# Patient Record
Sex: FEMALE | Race: WHITE | Employment: FULL TIME | URBAN - METROPOLITAN AREA
[De-identification: names, ages, dates, MRNs, and addresses within clinical notes are randomized per-mention and may not be internally consistent; named-entity substitution may affect disease eponyms.]

---

## 2017-04-11 ENCOUNTER — HOSPITAL ENCOUNTER (OUTPATIENT)
Dept: MAMMOGRAPHY | Age: 47
Discharge: HOME | End: 2017-04-11
Attending: OBSTETRICS & GYNECOLOGY | Admitting: OBSTETRICS & GYNECOLOGY

## 2017-04-11 DIAGNOSIS — Z12.31 VISIT FOR SCREENING MAMMOGRAM: ICD-10-CM

## 2018-06-02 ENCOUNTER — HOSPITAL ENCOUNTER (OUTPATIENT)
Dept: MAMMOGRAPHY | Age: 48
Discharge: HOME | End: 2018-06-02
Attending: SURGERY | Admitting: SURGERY

## 2018-06-02 DIAGNOSIS — Z12.31 VISIT FOR SCREENING MAMMOGRAM: ICD-10-CM

## 2022-04-26 ENCOUNTER — HOSPITAL ENCOUNTER (OUTPATIENT)
Dept: PHYSICAL THERAPY | Age: 52
Setting detail: THERAPIES SERIES
Discharge: HOME | End: 2022-04-26
Payer: COMMERCIAL

## 2022-04-26 PROCEDURE — 97530 THERAPEUTIC ACTIVITIES: CPT

## 2022-04-26 PROCEDURE — 97161 PT EVAL LOW COMPLEX 20 MIN: CPT

## 2022-04-26 PROCEDURE — 97110 THERAPEUTIC EXERCISES: CPT

## 2022-04-26 NOTE — PLAN OF CARE
[address] [address], [facility] [address]  Phone: [phone]   Fax: [phone]                                                       Rick King    Dear Dr. Yuli Francis  ,    We had the pleasure of evaluating the following patient for physical therapy services at [address] [place] [address]. A summary of our findings can be found in the initial assessment below. This includes our plan of care. If you have any questions or concerns regarding these findings, please do not hesitate to contact me at the office phone number checked above. Thank you for the referral.       Physician Signature:_______________________________Date:__________________  By signing above (or electronic signature), therapists plan is approved by physician      Patient: Ofelia Felix   : 1970   MRN: [id number]  Referring Physician:  Alfonzo Meckel, MD      Evaluation Date: 2022      Medical Diagnosis Information:  Diagnosis: M70.72 (ICD-10-CM) - Bursitis of left hip   Treatment Diagnosis: Decreased strength in the hip (B), decreased neuromuscular control                                         Insurance information: PT Insurance Information: UMR     Precautions/ Contra-indications: fibromyalgia, recent COVID 19  Latex Allergy:  [x]NO      []YES  Preferred Language for Healthcare:   [x]English       []Other:    C-SSRS Triggered by Intake questionnaire (Past 2 wk assessment ):   [x] No, Questionnaire did not trigger screening.   [] Yes, Patient intake triggered C-SSRS Screening     [] Completed, no further action required. [] Completed, PCP notified via Epic    SUBJECTIVE: Patient stated complaint:  Pt's L hip is bothering her the most lately. \"It's not terrible lately but I notice I'm walking differently\". Has had loose joints for a long time per pt.   Pt went on a trip to [place] where she walked a lot and since then the pain has been more intense, cannot sleep on the L side. Has had issues on the R side in the past as well. Pt used to have tenderness to palpation to the area however now is able to have some pressure but not entirely as laying on that side is still painful. Notices pain with stairs, walking, climbing ladders. Pt recently went on a trip and noticed that walking on sand actually helped the pain which surprised her. Had positive COVID test 2 weeks ago, noticed some SOB and dizziness when she was active during and shortly after recovery. SOB is improving. Relevant Medical History: hypertension (contolled), fibromyalgia  Functional Outcome: FOTO physical FS primary measure score = 54; Risk adjusted = 54    Pain Scale: 2/10 recently, In February pain was an 8/10. Easing factors: None  Provocative factors: activity    Type: [x]Constant   [x]Intermittent  []Radiating []Localized [x]Other: depends on activity level     Numbness/Tingling: none in the L hip    Occupation/School:  (desk job)    Living Status/Prior Level of Function:Prior to this injury / incident, pt was independent with ADLs and IADLs. Pt reports a long history of bursitis in both hips (a few years). Pain exacerbated when she broke her ankle and had to stay off of it. OBJECTIVE:   Palpation: TTP at the L great trochanter and anterior hip    Functional Mobility/Transfers: WNL    Posture:  WNL    Bandages/Dressings/Incisions: NA    Gait: (include devices/WB status) independent, WNL         PROM AROM    L R L R   Hip Flexion WFL with tightness and mild pain at end range WNL     Hip Abduction       Hip ER WFL withtightness at end range, felt better with stretching WNL     Hip IR WNL WNL     Knee Flexion       Knee Extension       Dorsiflexion        Plantarflexion        Inversion        Eversion            Strength (0-5) / Myotomes Left Right   Hip Flexion - supine 4-* 4   Hip Flexion - seated (L1-2) 4+ 4+   Hip Abduction 4-* 4+   Hip  4+ 4+   Hip Adduction     Hip ER     Hip IR     Quads (L2-4) 4+ 4+   Hamstrings 4+ 4+   Ankle Dorsiflexion (L4-5)     Ankle Plantarflexion (S1-2)     Ankle Inversion     Ankle Eversion (S1-2)     Great Toe Extension (L5)          Flexibility     Hamstrings (90/90) Lacking 20 deg Lacking 15 deg   ITB (Yadi)     Quads (Ely's) Mod decrease Mod decrease   Hip Flexor Deepti Jovel)          Girth     Mid patella     Suprapatellar     Figure 8     Transmalleolar     Metatarsal Heads         Joint mobility:    [x]Normal    []Hypo   []Hyper      Balance: NV                       [x] Patient history, allergies, meds reviewed. Medical chart reviewed. See intake form. Review Of Systems (ROS):  [x]Performed Review of systems (Integumentary, CardioPulmonary, Neurological) by intake and observation. Intake form has been scanned into medical record. Patient has been instructed to contact their primary care physician regarding ROS issues if not already being addressed at this time.       Co-morbidities/Complexities (which will affect course of rehabilitation):   []None        [x]Hx of COVID   Arthritic conditions   []Rheumatoid arthritis (M05.9)  []Osteoarthritis (M19.91)  []Gout   Cardiovascular conditions   [x]Hypertension (I10)  []Hyperlipidemia (E78.5)  []Angina pectoris (I20)  []Atherosclerosis (I70)  []Pacemaker  []Hx of CABG/stent/  cardiac surgeries   Musculoskeletal conditions   []Disc pathology   []Congenital spine pathologies   []Osteoporosis (M81.8)  []Osteopenia (M85.8)  []Scoliosis       Endocrine conditions   []Hypothyroid (E03.9)  []Hyperthyroid Gastrointestinal conditions   []Constipation (B59.04)   Metabolic conditions   []Morbid obesity (E66.01)  []Diabetes type 1(E10.65) or 2 (E11.65)   []Neuropathy (G60.9)     Cardio/Pulmonary conditions   []Asthma (J45)  []Coughing   []COPD (J44.9)  []CHF  []A-fib   Psychological Disorders  []Anxiety (F41.9)  []Depression (F32.9) []Other:   Developmental Disorders  []Autism (F84.0)  []CP (G80)  []Down Syndrome (Q90.9)  []Developmental delay     Neurological conditions  []Prior Stroke (I69.30)  []Parkinson's (G20)  []Encephalopathy (G93.40)  []MS (G35)  []Post-polio (G14)  []SCI  []TBI  []ALS Other conditions  [x]Fibromyalgia (M79.7)  []Vertigo  []Syncope  []Kidney Failure  []Cancer      []currently undergoing                treatment  []Pregnancy  []Incontinence   Prior surgeries  []involved limb  []previous spinal surgery  [] section birth  []hysterectomy  []bowel / bladder surgery  []other relevant surgeries   []Other:              Barriers to/and or personal factors that will affect rehab potential:              []Age  []Sex    []Smoker              []Motivation/Lack of Motivation                        [x]Co-Morbidities              []Cognitive Function, education/learning barriers              []Environmental, home barriers              []profession/work barriers  []past PT/medical experience  []other:  Justification:     Falls Risk Assessment (30 days):   [x] Falls Risk assessed and no intervention required. [] Falls Risk assessed and Patient requires intervention due to being higher risk   TUG score (>12s at risk):     [] Falls education provided, including        ASSESSMENT: Pt is a 45 yr old female presenting with L hip bursitis. Pt's symptoms have improved since onset however continued to have mild symptoms and has a history of recurrent bursitis on both hips. Pt's strength is decreased on the L and flexibility is mildly decreased in the hip flexors and hamstrings. Pt would benefit from skilled physical therapy to improved these impairments and increase neuromuscular control for functional activities like hiking, prolonged walking, and stairs.     Functional Impairments:     []Noted lumbar/proximal hip/LE joint hypomobility   []Decreased LE functional ROM   [x]Decreased core/proximal hip strength and neuromuscular control   [x]Decreased LE functional strength   [x]Reduced balance/proprioceptive control   []other:      Functional Activity Limitations (from functional questionnaire and intake)   []Reduced ability to tolerate prolonged functional positions   [x]Reduced ability or difficulty with changes of positions or transfers between positions   []Reduced ability to maintain good posture and demonstrate good body mechanics with sitting, bending, and lifting   []Reduced ability to sleep   [] Reduced ability or tolerance with driving and/or computer work   []Reduced ability to perform lifting, carrying tasks   []Reduced ability to squat   []Reduced ability to forward bend   [x]Reduced ability to ambulate prolonged functional periods/distances/surfaces   [x]Reduced ability to ascend/descend stairs   [x]Reduced ability to run, hop, cut or jump   []other:    Participation Restrictions   []Reduced participation in self care activities   []Reduced participation in home management activities   []Reduced participation in work activities   [x]Reduced participation in social activities. [x]Reduced participation in sport/recreation activities. Classification :    []Signs/symptoms consistent with post-surgical status including decreased ROM, strength and function.    []Signs/symptoms consistent with joint sprain/strain   []Signs/symptoms consistent with patella-femoral syndrome   []Signs/symptoms consistent with knee OA/hip OA   []Signs/symptoms consistent with internal derangement of knee/Hip   []Signs/symptoms consistent with functional hip weakness/NMR control      []Signs/symptoms consistent with tendinitis/tendinosis    []signs/symptoms consistent with pathology which may benefit from Dry needling      [x]other:  Signs/symptoms consistent with bursitis    Prognosis/Rehab Potential:      []Excellent   [x]Good    []Fair   []Poor    Tolerance of evaluation/treatment:    []Excellent   [x]Good    []Fair   []Poor    Physical Therapy Evaluation Complexity Justification  [x] A history of present problem with:  [] no personal factors and/or comorbidities that impact the plan of care;  [x]1-2 personal factors and/or comorbidities that impact the plan of care  []3 personal factors and/or comorbidities that impact the plan of care  [x] An examination of body systems using standardized tests and measures addressing any of the following: body structures and functions (impairments), activity limitations, and/or participation restrictions;:  [x] a total of 1-2 or more elements   [] a total of 3 or more elements   [] a total of 4 or more elements   [x] A clinical presentation with:  [] stable and/or uncomplicated characteristics   [x] evolving clinical presentation with changing characteristics  [] unstable and unpredictable characteristics;   [x] Clinical decision making of [x] low, [] moderate, [] high complexity using standardized patient assessment instrument and/or measurable assessment of functional outcome. [x] EVAL (LOW) 06884 (typically 15 minutes face-to-face)  [] EVAL (MOD) 27054 (typically 30 minutes face-to-face)  [] EVAL (HIGH) 94814 (typically 45 minutes face-to-face)  [] RE-EVAL     PLAN:   Frequency/Duration:  2 days per week for 6 Weeks:  Interventions:  [x]  Therapeutic exercise including: strength training, ROM, for Lower extremity and core   [x]  NMR activation and proprioception for LE, Glutes and Core   [x]  Manual therapy as indicated for LE, Hip and spine to include: Dry Needling/IASTM, STM, PROM, Gr I-IV mobilizations, manipulation. [x] Modalities as needed that may include: thermal agents, E-stim, Biofeedback, US, iontophoresis as indicated  [x] Patient education on joint protection, postural re-education, activity modification, progression of HEP. HEP instruction: Written HEP instructions provided and reviewed     Access Code: [email or web address]  URL: [email or web address]  Date: 04/26/2022  Prepared by: Jose Liu Dean    Exercises  Prone Quadriceps Stretch with Strap - 1 x daily - 7 x weekly - 1 sets - 3 reps  Supine Hamstring Stretch with Strap - 1 x daily - 7 x weekly - 1 sets - 3 reps  Supine ITB Stretch with Strap - 1 x daily - 7 x weekly - 1 sets - 3 reps  Sidelying ITB Stretch off Table - 1 x daily - 7 x weekly - 1 sets - 3 reps  ITB Stretch at Wall - 1 x daily - 7 x weekly - 1 sets - 3 reps  Supine ITB Stretch - 1 x daily - 7 x weekly - 1 sets - 3 reps  Clamshell - 1 x daily - 7 x weekly - 3 sets - 10 reps      GOALS:  Patient stated goal: To walk normally and loosed tight hip muscles. [] Progressing: [] Met: [] Not Met: [] Adjusted    Therapist goals for Patient:   Short Term Goals: To be achieved in: 2 weeks  1. Independent in HEP and progression per patient tolerance, in order to prevent re-injury. [] Progressing: [] Met: [] Not Met: [] Adjusted  2. Patient will have a decrease in pain to facilitate improvement in movement, function, and ADLs as indicated by Functional Deficits. [] Progressing: [] Met: [] Not Met: [] Adjusted    Long Term Goals: To be achieved in: 6 weeks  1. FOTO score of at least 65 to assist with reaching prior level of function. [] Progressing: [] Met: [] Not Met: [] Adjusted  2. Patient will demonstrate increased PROM to full and pain-free with HS and HF flexibility WNL to allow for proper joint functioning as indicated by patients Functional Deficits. - Progressing: - Met: - Not Met: - Adjusted  3. Patient will demonstrate an increase in Strength to at least 5/5 as well as good proximal hip strength and control to allow for proper functional mobility as indicated by patients Functional Deficits. [] Progressing: [] Met: [] Not Met: [] Adjusted  4. Patient will return to functional activities including ascending and descending 12 6\" steps independnetly without increased symptoms or restriction. [] Progressing: [] Met: [] Not Met: [] Adjusted  5.  Pt will tolerate 1 hour of ambulation or standing activity without increased symptoms. [] Progressing: [] Met: [] Not Met: [] Adjusted  6. Pt will report pain at rest is 0/10 and highest pain 1/10 for improved activity tolerance.    [] Progressing: [] Met: [] Not Met: [] Adjusted      Electronically signed by:  Linda Sharpe, PT, DPT

## 2022-04-27 NOTE — FLOWSHEET NOTE
[address] [facility]  Phone: [phone]   Fax: [phone]    Physical Therapy Treatment Note/ Progress Report:     Date:  2022    Patient Name:  Amalia Primrose    :  1970  MRN: [id number]  Restrictions/Precautions:    Medical/Treatment Diagnosis Information:  · Diagnosis: M70.72 (ICD-10-CM) - Bursitis of left hip  · Treatment Diagnosis: Decreased strength in the hip (B), decreased neuromuscular control  Insurance/Certification information:  PT Insurance Information: UMR  Physician Information:   Rafa Bentley MD  Plan of care signed (Y/N): []  Yes [x]  No     Date of Patient follow up with Physician:      Progress Report: []  Yes  [x]  No     Date Range for reporting period:  Beginnin22  PN:  Ending:     Progress report due (10 Rx/or 30 days whichever is less): visit #10 or      Recertification due (POC duration/ or 90 days whichever is less): visit #12 or 22    Visit # Insurance Allowable Auth required?  Date Range    60 combined, 10% co-insurance []  Yes  [x]  No NA       Latex Allergy:  [x]NO      []YES  Preferred Language for Healthcare:   [x]English       []other:    Functional Scale:           Date assessed:  FOTO physical FS primary measure score = 54; risk adjusted = 54  22    Pain level:  210     SUBJECTIVE:  See eval    OBJECTIVE: See eval      RESTRICTIONS/PRECAUTIONS: fibromyalgia    Exercises/Interventions:     Therapeutic Exercise (92314)  Resistance / level Sets/sec Reps Notes / Cues          Bike/dynamic warm up              Lateral band walks       Monster walks              Excursion lunges       Wall sits       Hip 3 way with band              Therapeutic Activities (09830)       Step ups 6\"      Heel taps 4\"                    Neuromuscular Re-ed (20293)       Assess balance                     Manual Intervention (42280)       Consider end range hip PROM in the future Modalities:     Pt. Education:  -pt educated on diagnosis, prognosis and expectations for rehab  -all pt questions were answered    Home Exercise Program:  4/27: Access Code: [email or web address]  URL: [email or web address]  Date: 04/26/2022  Prepared by: Giancarlo Gregg     Exercises  Prone Quadriceps Stretch with Strap - 1 x daily - 7 x weekly - 1 sets - 3 reps  Supine Hamstring Stretch with Strap - 1 x daily - 7 x weekly - 1 sets - 3 reps  Supine ITB Stretch with Strap - 1 x daily - 7 x weekly - 1 sets - 3 reps  Sidelying ITB Stretch off Table - 1 x daily - 7 x weekly - 1 sets - 3 reps  ITB Stretch at Wall - 1 x daily - 7 x weekly - 1 sets - 3 reps  Supine ITB Stretch - 1 x daily - 7 x weekly - 1 sets - 3 reps  Clamshell - 1 x daily - 7 x weekly - 3 sets - 10 reps       Therapeutic Exercise and NMR EXR  [x] (91588) Provided verbal/tactile cueing for activities related to strengthening, flexibility, endurance, ROM for improvements in LE, proximal hip, and core control with self care, mobility, lifting, ambulation.  [] (42341) Provided verbal/tactile cueing for activities related to improving balance, coordination, kinesthetic sense, posture, motor skill, proprioception  to assist with LE, proximal hip, and core control in self care, mobility, lifting, ambulation and eccentric single leg control.   [] (02752) Therapist is in constant attendance of 2 or more patients providing skilled therapy interventions, but not providing any significant amount of measurable one-on-one time to either patient, for improvements in LE, proximal hip, and core control in self care, mobility, lifting, ambulation and eccentric single leg control.      NMR and Therapeutic Activities:    [] (73740 or 60083) Provided verbal/tactile cueing for activities related to improving balance, coordination, kinesthetic sense, posture, motor skill, proprioception and motor activation to allow for proper function of core, proximal hip and LE with self care and ADLs  [] (98578) Gait Re-education- Provided training and instruction to the patient for proper LE, core and proximal hip recruitment and positioning and eccentric body weight control with ambulation re-education including up and down stairs     Home Exercise Program:    [x] (80114) Reviewed/Progressed HEP activities related to strengthening, flexibility, endurance, ROM of core, proximal hip and LE for functional self-care, mobility, lifting and ambulation/stair navigation   [] (81282)Reviewed/Progressed HEP activities related to improving balance, coordination, kinesthetic sense, posture, motor skill, proprioception of core, proximal hip and LE for self care, mobility, lifting, and ambulation/stair navigation      Manual Treatments:  PROM / STM / Oscillations-Mobs:  G-I, II, III, IV (PA's, Inf., Post.)  [] (67449) Provided manual therapy to mobilize LE, proximal hip and/or LS spine soft tissue/joints for the purpose of modulating pain, promoting relaxation,  increasing ROM, reducing/eliminating soft tissue swelling/inflammation/restriction, improving soft tissue extensibility and allowing for proper ROM for normal function with self care, mobility, lifting and ambulation. Modalities:  [] (21888) Vasopneumatic compression: Utilized vasopneumatic compression to decrease edema / swelling for the purpose of improving mobility and quad tone / recruitment which will allow for increased overall function including but not limited to self-care, transfers, ambulation, and ascending / descending stairs.        Charges:  Timed Code Treatment Minutes: 25   Total Treatment Minutes: 45     [x] EVAL - LOW (35823)   [] EVAL - MOD (39000)  [] EVAL - HIGH (58944)  [] RE-EVAL (81661)  [x] FL(36963) x  1    [] Ionto  [] NMR (43475) x      [] Vaso  [] Manual (22832) x      [] Ultrasound  [x] TA x   1    [] Mech Traction (27425)  [] Aquatic Therapy x     [] ES (un) (99748):   [] Home Management Training x [] ES(attended) (12601)   [] Group:     [] Other:     GOALS:   Patient stated goal: To walk normally and loosed tight hip muscles. []? Progressing: []? Met: []? Not Met: []? Adjusted     Therapist goals for Patient:   Short Term Goals: To be achieved in: 2 weeks  1. Independent in HEP and progression per patient tolerance, in order to prevent re-injury. []? Progressing: []? Met: []? Not Met: []? Adjusted  2. Patient will have a decrease in pain to facilitate improvement in movement, function, and ADLs as indicated by Functional Deficits. []? Progressing: []? Met: []? Not Met: []? Adjusted     Long Term Goals: To be achieved in: 6 weeks  1. FOTO score of at least 65 to assist with reaching prior level of function. []? Progressing: []? Met: []? Not Met: []? Adjusted  2. Patient will demonstrate increased PROM to full and pain-free with HS and HF flexibility WNL to allow for proper joint functioning as indicated by patients Functional Deficits. - Progressing: - Met: - Not Met: - Adjusted  3. Patient will demonstrate an increase in Strength to at least 5/5 as well as good proximal hip strength and control to allow for proper functional mobility as indicated by patients Functional Deficits. []? Progressing: []? Met: []? Not Met: []? Adjusted  4. Patient will return to functional activities including ascending and descending 12 6\" steps independnetly without increased symptoms or restriction. []? Progressing: []? Met: []? Not Met: []? Adjusted  5. Pt will tolerate 1 hour of ambulation or standing activity without increased symptoms. []? Progressing: []? Met: []? Not Met: []? Adjusted  6. Pt will report pain at rest is 0/10 and highest pain 1/10 for improved activity tolerance. []? Progressing: []? Met: []? Not Met: []? Adjusted          Overall Progression Towards Functional goals/ Treatment Progress Update:  [] Patient is progressing as expected towards functional goals listed.     [] Progression is slowed due to complexities/Impairments listed. [] Progression has been slowed due to co-morbidities. [x] Plan just implemented, too soon to assess goals progression <30days   [] Goals require adjustment due to lack of progress  [] Patient is not progressing as expected and requires additional follow up with physician  [] Other    Persisting Functional Limitations/Impairments:  [x]Sitting [x]Standing   []Walking [x]Stairs   [x]Transfers []ADLs   [x]Squatting/bending []Kneeling  []Housework []Job related tasks  []Driving [x]Sports/Recreation   []Sleeping []Other:    ASSESSMENT:  See eval  Treatment/Activity Tolerance:  [] Pt able to complete treatment [] Patient limited by fatique  [] Patient limited by pain  [] Patient limited by other medical complications  [] Other:     Prognosis: [x] Good [] Fair  [] Poor    Patient Requires Follow-up: [x] Yes  [] No    Return to Play:    [x]  N/A   []  Stage 1: Intro to Strength   []  Stage 2: Return to Run and Strength   []  Stage 3: Return to Jump and Strength   []  Stage 4: Dynamic Strength and Agility   []  Stage 5: Sport Specific Training     []  Ready to Return to Play, Meets All Above Stages   []  Not Ready for Return to Sports   Comments:            PLAN: See eval. PT 2x / week for 6 weeks. [] Continue per plan of care [] Alter current plan (see comments)  [x] Plan of care initiated [] Hold pending MD visit [] Discharge    Electronically signed by: Constantino Wade, PT, DPT      Note: If patient does not return for scheduled/ recommended follow up visits, this note will serve as a discharge from care along with most recent update on progress.

## 2022-04-29 ENCOUNTER — HOSPITAL ENCOUNTER (OUTPATIENT)
Dept: PHYSICAL THERAPY | Age: 52
Setting detail: THERAPIES SERIES
Discharge: HOME | End: 2022-04-29
Payer: COMMERCIAL

## 2022-04-29 PROCEDURE — 97110 THERAPEUTIC EXERCISES: CPT

## 2022-04-29 PROCEDURE — 97140 MANUAL THERAPY 1/> REGIONS: CPT

## 2022-04-29 NOTE — FLOWSHEET NOTE
[address] [facility]  Phone: [phone]   Fax: [phone]    Physical Therapy Treatment Note/ Progress Report:     Date:  2022    Patient Name:  Jamaal Mcintosh    :  1970  MRN: [id number]  Restrictions/Precautions:    Medical/Treatment Diagnosis Information:  · Diagnosis: M70.72 (ICD-10-CM) - Bursitis of left hip  · Treatment Diagnosis: Decreased strength in the hip (B), decreased neuromuscular control  Insurance/Certification information:  PT Insurance Information: R  Physician Information:   Bernard Duran MD  Plan of care signed (Y/N): []  Yes [x]  No     Date of Patient follow up with Physician:      Progress Report: []  Yes  [x]  No     Date Range for reporting period:   Beginnin22  PN:  Ending:     Progress report due (10 Rx/or 30 days whichever is less): visit #10 or      Recertification due (POC duration/ or 90 days whichever is less): visit #12 or 22    Visit # Insurance Allowable Auth required? Date Range    60 combined, 10% co-insurance []  Yes  [x]  No NA       Latex Allergy:  [x]NO      []YES  Preferred Language for Healthcare:   [x]English       []other:    Functional Scale:           Date assessed:  [facility] physical FS primary measure score = 54; risk adjusted = 54  22    Pain level:  2/10     SUBJECTIVE:  Pt reports that she is doing well, clamshells are difficult but doable, has bee compliant with her home stretches. OBJECTIVE: See eval  : SLB 2-5 sec with excessive hip and knee strategies.        RESTRICTIONS/PRECAUTIONS: fibromyalgia    Exercises/Interventions:     Therapeutic Exercise (22864)  Resistance / level Sets/sec Reps Notes / Cues          Bike/dynamic warm up:  High knees  hacky sack kicks  Butt kicks  3 way HR     20  20  20  10 ea    gastroc stretch  X 30 sec B     Lateral band walks orange  3 laps    Monster walks orange  3 laps           Excursion lunges       Wall sits       Hip 3 way with fingertip assist   10 B           ITB stretch with strap  2 x 30\" B                   Therapeutic Activities (53637)       Step ups 6\"      Heel taps (lat) 4\"  10 B                  Neuromuscular Re-ed (12370)       Soccer ball rolls A/P, M/L, CW/CCW  10 ea B                  Manual Intervention ([id number])       Consider end range hip PROM in the future              Roller (green) to the L glute and ITB  x10'                              Modalities:     Pt. Education:  -pt educated on diagnosis, prognosis and expectations for rehab  -all pt questions were answered    Home Exercise Program:  4/27: Access Code: [email or web address]  URL: [email or web address]  Date: 04/26/2022  Prepared by: Jovi Agudelo     Exercises  Prone Quadriceps Stretch with Strap - 1 x daily - 7 x weekly - 1 sets - 3 reps  Supine Hamstring Stretch with Strap - 1 x daily - 7 x weekly - 1 sets - 3 reps  Supine ITB Stretch with Strap - 1 x daily - 7 x weekly - 1 sets - 3 reps  Sidelying ITB Stretch off Table - 1 x daily - 7 x weekly - 1 sets - 3 reps  ITB Stretch at Wall - 1 x daily - 7 x weekly - 1 sets - 3 reps  Supine ITB Stretch - 1 x daily - 7 x weekly - 1 sets - 3 reps  Clamshell - 1 x daily - 7 x weekly - 3 sets - 10 reps       Therapeutic Exercise and NMR EXR  [x] (16931) Provided verbal/tactile cueing for activities related to strengthening, flexibility, endurance, ROM for improvements in LE, proximal hip, and core control with self care, mobility, lifting, ambulation.  [] (24944) Provided verbal/tactile cueing for activities related to improving balance, coordination, kinesthetic sense, posture, motor skill, proprioception  to assist with LE, proximal hip, and core control in self care, mobility, lifting, ambulation and eccentric single leg control.   [] (01266) Therapist is in constant attendance of 2 or more patients providing skilled therapy interventions, but not providing any significant amount of measurable one-on-one time to either patient, for improvements in LE, proximal hip, and core control in self care, mobility, lifting, ambulation and eccentric single leg control. NMR and Therapeutic Activities:    [] (00389 or 17312) Provided verbal/tactile cueing for activities related to improving balance, coordination, kinesthetic sense, posture, motor skill, proprioception and motor activation to allow for proper function of core, proximal hip and LE with self care and ADLs  [] (41110) Gait Re-education- Provided training and instruction to the patient for proper LE, core and proximal hip recruitment and positioning and eccentric body weight control with ambulation re-education including up and down stairs     Home Exercise Program:    [x] (52031) Reviewed/Progressed HEP activities related to strengthening, flexibility, endurance, ROM of core, proximal hip and LE for functional self-care, mobility, lifting and ambulation/stair navigation   [] (82992)Reviewed/Progressed HEP activities related to improving balance, coordination, kinesthetic sense, posture, motor skill, proprioception of core, proximal hip and LE for self care, mobility, lifting, and ambulation/stair navigation      Manual Treatments:  PROM / STM / Oscillations-Mobs:  G-I, II, III, IV (PA's, Inf., Post.)  [] (53513) Provided manual therapy to mobilize LE, proximal hip and/or LS spine soft tissue/joints for the purpose of modulating pain, promoting relaxation,  increasing ROM, reducing/eliminating soft tissue swelling/inflammation/restriction, improving soft tissue extensibility and allowing for proper ROM for normal function with self care, mobility, lifting and ambulation.      Modalities:  [] (86217) Vasopneumatic compression: Utilized vasopneumatic compression to decrease edema / swelling for the purpose of improving mobility and quad tone / recruitment which will allow for increased overall function including but not limited to self-care, transfers, ambulation, and ascending / descending stairs. Charges:  Timed Code Treatment Minutes: 40   Total Treatment Minutes: 40     [] EVAL - LOW (98093)   [] EVAL - MOD (16888)  [] EVAL - HIGH (26932)  [] RE-EVAL (92195)  [x] QT(57538) x  2   [] Ionto  [] NMR (75680) x      [] Vaso  [x] Manual (38930) x  1    [] Ultrasound  [] TA x   1    [] Mech Traction (33508)  [] Aquatic Therapy x     [] ES (un) (92288):   [] Home Management Training x [] ES(attended) (96224)   [] Group:     [] Other:     GOALS:   Patient stated goal: To walk normally and loosed tight hip muscles. []? Progressing: []? Met: []? Not Met: []? Adjusted     Therapist goals for Patient:   Short Term Goals: To be achieved in: 2 weeks  1. Independent in HEP and progression per patient tolerance, in order to prevent re-injury. []? Progressing: []? Met: []? Not Met: []? Adjusted  2. Patient will have a decrease in pain to facilitate improvement in movement, function, and ADLs as indicated by Functional Deficits. []? Progressing: []? Met: []? Not Met: []? Adjusted     Long Term Goals: To be achieved in: 6 weeks  1. FOTO score of at least 65 to assist with reaching prior level of function. []? Progressing: []? Met: []? Not Met: []? Adjusted  2. Patient will demonstrate increased PROM to full and pain-free with HS and HF flexibility WNL to allow for proper joint functioning as indicated by patients Functional Deficits. - Progressing: - Met: - Not Met: - Adjusted  3. Patient will demonstrate an increase in Strength to at least 5/5 as well as good proximal hip strength and control to allow for proper functional mobility as indicated by patients Functional Deficits. []? Progressing: []? Met: []? Not Met: []? Adjusted  4. Patient will return to functional activities including ascending and descending 12 6\" steps independnetly without increased symptoms or restriction. []? Progressing: []? Met: []? Not Met: []? Adjusted  5.  Pt will tolerate 1 hour of ambulation or standing activity without increased symptoms. []? Progressing: []? Met: []? Not Met: []? Adjusted  6. Pt will report pain at rest is 0/10 and highest pain 1/10 for improved activity tolerance. []? Progressing: []? Met: []? Not Met: []? Adjusted          Overall Progression Towards Functional goals/ Treatment Progress Update:  [] Patient is progressing as expected towards functional goals listed. [] Progression is slowed due to complexities/Impairments listed. [] Progression has been slowed due to co-morbidities. [x] Plan just implemented, too soon to assess goals progression <30days   [] Goals require adjustment due to lack of progress  [] Patient is not progressing as expected and requires additional follow up with physician  [] Other    Persisting Functional Limitations/Impairments:  [x]Sitting [x]Standing   []Walking [x]Stairs   [x]Transfers []ADLs   [x]Squatting/bending []Kneeling  []Housework []Job related tasks  []Driving [x]Sports/Recreation   []Sleeping []Other:    ASSESSMENT:  Pt fatigued with initiation of exercises. Added rolling to end of session to improve tolerance. Will monitor NV and progress log and HEP as tolerable. Treatment/Activity Tolerance:  [] Pt able to complete treatment [x] Patient limited by fatique  [] Patient limited by pain  [] Patient limited by other medical complications  [] Other:     Prognosis: [x] Good [] Fair  [] Poor    Patient Requires Follow-up: [x] Yes  [] No    Return to Play:    [x]  N/A   []  Stage 1: Intro to Strength   []  Stage 2: Return to Run and Strength   []  Stage 3: Return to Jump and Strength   []  Stage 4: Dynamic Strength and Agility   []  Stage 5: Sport Specific Training     []  Ready to Return to Play, Meets All Above Stages   []  Not Ready for Return to Sports   Comments:            PLAN: See eval. PT 2x / week for 6 weeks.    [] Continue per plan of care [] Alter current plan (see comments)  [x] Plan of care initiated [] Hold pending MD visit [] Discharge    Electronically signed by: Miguelito Crow, PT, DPT      Note: If patient does not return for scheduled/ recommended follow up visits, this note will serve as a discharge from care along with most recent update on progress.

## 2022-05-04 ENCOUNTER — HOSPITAL ENCOUNTER (OUTPATIENT)
Dept: PHYSICAL THERAPY | Age: 52
Setting detail: THERAPIES SERIES
Discharge: HOME | End: 2022-05-04
Payer: COMMERCIAL

## 2022-05-04 PROCEDURE — 97140 MANUAL THERAPY 1/> REGIONS: CPT

## 2022-05-04 PROCEDURE — 97110 THERAPEUTIC EXERCISES: CPT

## 2022-05-04 PROCEDURE — 97112 NEUROMUSCULAR REEDUCATION: CPT

## 2022-05-04 NOTE — FLOWSHEET NOTE
[address] [facility]  Phone: [phone]   Fax: [phone]    Physical Therapy Treatment Note/ Progress Report:     Date:  2022    Patient Name:  Hortencia Robles    :  1970  MRN: [id number]  Restrictions/Precautions:    Medical/Treatment Diagnosis Information:  · Diagnosis: M70.72 (ICD-10-CM) - Bursitis of left hip  · Treatment Diagnosis: Decreased strength in the hip (B), decreased neuromuscular control  Insurance/Certification information:  PT Insurance Information: R  Physician Information:   Christopher Noriega MD  Plan of care signed (Y/N): []  Yes [x]  No     Date of Patient follow up with Physician:      Progress Report: []  Yes  [x]  No     Date Range for reporting period:   Beginnin22  PN:  Ending:     Progress report due (10 Rx/or 30 days whichever is less): visit #10 or      Recertification due (POC duration/ or 90 days whichever is less): visit #12 or 22    Visit # Insurance Allowable Auth required? Date Range   3/12 60 combined, 10% co-insurance []  Yes  [x]  No NA       Latex Allergy:  [x]NO      []YES  Preferred Language for Healthcare:   [x]English       []other:    Functional Scale:           Date assessed:  [facility] physical FS primary measure score = 54; risk adjusted = 54  22    Pain level:  2/10     SUBJECTIVE:  Pt reports that she has been feeling pretty good. Notices a small amount of pain with vacuuming but not too much pain throughout the day. Mild spasms after last session the evening after last session. Has since resolved. OBJECTIVE: See eval  : SLB 2-5 sec with excessive hip and knee strategies.         RESTRICTIONS/PRECAUTIONS: fibromyalgia    Exercises/Interventions:     Therapeutic Exercise (24705)  Resistance / level Sets/sec Reps Notes / Cues          Bike/dynamic warm up:  High knees  hacky sack kicks  Butt kicks  3 way HR    Bike     40  20  20  10 ea    4'    gastroc stretch  X 30 sec B Lateral band walks    Monster walks    Lateral squat walks  20 ft 4    Excursion lunges   5 B    Wall sits  To fatigue 4 reps    Hip 3 way with fingertip assist  2 10 B           ITB stretch with strap                  Therapeutic Activities (42234)       Step ups 6\"      Heel taps (lat) 4\"  10 B 5/4: ant. Knee pain noted. Neuromuscular Re-ed (80180)       Soccer ball rolls A/P, M/L, CW/CCW  10 ea B    SLB airex 3 x 30\"            Manual Intervention (11780)       Consider end range hip PROM in the future              Roller (green) to the L glute and ITB  x10'                              Modalities:     Pt. Education:  -pt educated on diagnosis, prognosis and expectations for rehab  -all pt questions were answered    Home Exercise Program:  5/4:  Access Code: [email or web address]  URL: [employer]/  Date: 05/04/2022  Prepared by: Dominique Hastings    Exercises  Standing Repeated Hip Flexion with Ankle Weight - 1 x daily - 3-5 x weekly - 2 sets - 10 reps  Standing Hip Abduction - 1 x daily - 3-5 x weekly - 2 sets - 10 reps  Standing Hip Extension - 1 x daily - 3-5 x weekly - 2 sets - 10 reps      4/27: Access Code: [email or web address]  URL: [employer]/  Date: 04/26/2022  Prepared by: Dominique Hastings     Exercises  Prone Quadriceps Stretch with Strap - 1 x daily - 7 x weekly - 1 sets - 3 reps  Supine Hamstring Stretch with Strap - 1 x daily - 7 x weekly - 1 sets - 3 reps  Supine ITB Stretch with Strap - 1 x daily - 7 x weekly - 1 sets - 3 reps  Sidelying ITB Stretch off Table - 1 x daily - 7 x weekly - 1 sets - 3 reps  ITB Stretch at Wall - 1 x daily - 7 x weekly - 1 sets - 3 reps  Supine ITB Stretch - 1 x daily - 7 x weekly - 1 sets - 3 reps  Clamshell - 1 x daily - 7 x weekly - 3 sets - 10 reps       Therapeutic Exercise and NMR EXR  [x] (26293) Provided verbal/tactile cueing for activities related to strengthening, flexibility, endurance, ROM for improvements in LE, proximal hip, and core control with self care, mobility, lifting, ambulation.  [] (65119) Provided verbal/tactile cueing for activities related to improving balance, coordination, kinesthetic sense, posture, motor skill, proprioception  to assist with LE, proximal hip, and core control in self care, mobility, lifting, ambulation and eccentric single leg control.   [] (77453) Therapist is in constant attendance of 2 or more patients providing skilled therapy interventions, but not providing any significant amount of measurable one-on-one time to either patient, for improvements in LE, proximal hip, and core control in self care, mobility, lifting, ambulation and eccentric single leg control.      NMR and Therapeutic Activities:    [] (71711 or 04674) Provided verbal/tactile cueing for activities related to improving balance, coordination, kinesthetic sense, posture, motor skill, proprioception and motor activation to allow for proper function of core, proximal hip and LE with self care and ADLs  [] (78301) Gait Re-education- Provided training and instruction to the patient for proper LE, core and proximal hip recruitment and positioning and eccentric body weight control with ambulation re-education including up and down stairs     Home Exercise Program:    [x] (46388) Reviewed/Progressed HEP activities related to strengthening, flexibility, endurance, ROM of core, proximal hip and LE for functional self-care, mobility, lifting and ambulation/stair navigation   [] (26334)Reviewed/Progressed HEP activities related to improving balance, coordination, kinesthetic sense, posture, motor skill, proprioception of core, proximal hip and LE for self care, mobility, lifting, and ambulation/stair navigation      Manual Treatments:  PROM / STM / Oscillations-Mobs:  G-I, II, III, IV (PA's, Inf., Post.)  [x] (67913) Provided manual therapy to mobilize LE, proximal hip and/or LS spine soft tissue/joints for the purpose of modulating pain, promoting relaxation,  increasing ROM, reducing/eliminating soft tissue swelling/inflammation/restriction, improving soft tissue extensibility and allowing for proper ROM for normal function with self care, mobility, lifting and ambulation. Modalities:  [] (92530) Vasopneumatic compression: Utilized vasopneumatic compression to decrease edema / swelling for the purpose of improving mobility and quad tone / recruitment which will allow for increased overall function including but not limited to self-care, transfers, ambulation, and ascending / descending stairs. Charges:  Timed Code Treatment Minutes: 43   Total Treatment Minutes: 43     [] EVAL - LOW (00325)   [] EVAL - MOD (31958)  [] EVAL - HIGH (99761)  [] RE-EVAL (59502)  [x] [place](31062) x  1   [] Ionto  [x] NMR (36538) x   1   [] Vaso  [x] Manual (73045) x  1    [] Ultrasound  [] TA x   1    [] Mech Traction (51943)  [] Aquatic Therapy x     [] ES (un) (12664):   [] Home Management Training x [] ES(attended) (87967)   [] Group:     [] Other:     GOALS:   Patient stated goal: To walk normally and loosed tight hip muscles. []? Progressing: []? Met: []? Not Met: []? Adjusted     Therapist goals for Patient:   Short Term Goals: To be achieved in: 2 weeks  1. Independent in HEP and progression per patient tolerance, in order to prevent re-injury. []? Progressing: []? Met: []? Not Met: []? Adjusted  2. Patient will have a decrease in pain to facilitate improvement in movement, function, and ADLs as indicated by Functional Deficits. []? Progressing: []? Met: []? Not Met: []? Adjusted     Long Term Goals: To be achieved in: 6 weeks  1. FOTO score of at least 65 to assist with reaching prior level of function. []? Progressing: []? Met: []? Not Met: []? Adjusted  2. Patient will demonstrate increased PROM to full and pain-free with HS and HF flexibility WNL to allow for proper joint functioning as indicated by patients Functional Deficits.    - Progressing: - Met: - Not Met: - Adjusted  3. Patient will demonstrate an increase in Strength to at least 5/5 as well as good proximal hip strength and control to allow for proper functional mobility as indicated by patients Functional Deficits. []? Progressing: []? Met: []? Not Met: []? Adjusted  4. Patient will return to functional activities including ascending and descending 12 6\" steps independnetly without increased symptoms or restriction. []? Progressing: []? Met: []? Not Met: []? Adjusted  5. Pt will tolerate 1 hour of ambulation or standing activity without increased symptoms. []? Progressing: []? Met: []? Not Met: []? Adjusted  6. Pt will report pain at rest is 0/10 and highest pain 1/10 for improved activity tolerance. []? Progressing: []? Met: []? Not Met: []? Adjusted          Overall Progression Towards Functional goals/ Treatment Progress Update:  [] Patient is progressing as expected towards functional goals listed. [] Progression is slowed due to complexities/Impairments listed. [] Progression has been slowed due to co-morbidities. [x] Plan just implemented, too soon to assess goals progression <30days   [] Goals require adjustment due to lack of progress  [] Patient is not progressing as expected and requires additional follow up with physician  [] Other    Persisting Functional Limitations/Impairments:  [x]Sitting [x]Standing   []Walking [x]Stairs   [x]Transfers []ADLs   [x]Squatting/bending []Kneeling  []Housework []Job related tasks  []Driving [x]Sports/Recreation   []Sleeping []Other:    ASSESSMENT:  Progressed HEP to include hip 3 way. Continued progression with 1 instance of mild anterior knee pain however did not persist.  Will continue to progress as tolerable.    Treatment/Activity Tolerance:  [] Pt able to complete treatment [x] Patient limited by fatique  [] Patient limited by pain  [] Patient limited by other medical complications  [] Other:     Prognosis: [x] Good [] Fair  [] Poor    Patient Requires Follow-up: [x] Yes  [] No    Return to Play:    [x]  N/A   []  Stage 1: Intro to Strength   []  Stage 2: Return to Run and Strength   []  Stage 3: Return to Jump and Strength   []  Stage 4: Dynamic Strength and Agility   []  Stage 5: Sport Specific Training     []  Ready to Return to Play, Meets All Above Stages   []  Not Ready for Return to Sports   Comments:            PLAN: See eval. PT 2x / week for 6 weeks. [] Continue per plan of care [] Alter current plan (see comments)  [x] Plan of care initiated [] Hold pending MD visit [] Discharge    Electronically signed by: Jovi Agudelo, PT, DPT      Note: If patient does not return for scheduled/ recommended follow up visits, this note will serve as a discharge from care along with most recent update on progress.

## 2022-05-06 ENCOUNTER — HOSPITAL ENCOUNTER (OUTPATIENT)
Dept: PHYSICAL THERAPY | Age: 52
Setting detail: THERAPIES SERIES
Discharge: HOME | End: 2022-05-06
Payer: COMMERCIAL

## 2022-05-06 PROCEDURE — 97112 NEUROMUSCULAR REEDUCATION: CPT

## 2022-05-06 PROCEDURE — 97110 THERAPEUTIC EXERCISES: CPT

## 2022-05-06 PROCEDURE — 97140 MANUAL THERAPY 1/> REGIONS: CPT

## 2022-05-06 NOTE — FLOWSHEET NOTE
[address] [facility]  Phone: [phone]   Fax: [phone]    Physical Therapy Treatment Note/ Progress Report:     Date:  2022    Patient Name:  Yudi Edouard    :  1970  MRN: [id number]  Restrictions/Precautions:    Medical/Treatment Diagnosis Information:  · Diagnosis: M70.72 (ICD-10-CM) - Bursitis of left hip  · Treatment Diagnosis: Decreased strength in the hip (B), decreased neuromuscular control  Insurance/Certification information:  PT Insurance Information: UMR  Physician Information:   Mally Mancilla MD  Plan of care signed (Y/N): []  Yes [x]  No     Date of Patient follow up with Physician:      Progress Report: []  Yes  [x]  No     Date Range for reporting period:   Beginnin22  PN:  Ending:     Progress report due (10 Rx/or 30 days whichever is less): visit #10 or      Recertification due (POC duration/ or 90 days whichever is less): visit #12 or 22    Visit # Insurance Allowable Auth required? Date Range    60 combined, 10% co-insurance []  Yes  [x]  No NA       Latex Allergy:  [x]NO      []YES  Preferred Language for Healthcare:   [x]English       []other:    Functional Scale:           Date assessed:  [facility] physical FS primary measure score = 54; risk adjusted = 54  22    Pain level:  2/10     SUBJECTIVE:  Pt reports 3/10 pain today, having pain in the low back and knee. Had some low back pain after last session but no increase in pain in the hip. Completing stairs are improving (not as slow and painful). OBJECTIVE: See eval  : SLB 2-5 sec with excessive hip and knee strategies.         RESTRICTIONS/PRECAUTIONS: fibromyalgia    Exercises/Interventions:     Therapeutic Exercise (99839)  Resistance / level Sets/sec Reps Notes / Cues          Bike/dynamic warm up:  High knees  hacky sack kicks  Butt kicks  3 way HR    Bike     20  20  20  10 ea    4'    gastroc stretch  X 30 sec B     Lateral band walks Monster walks    Lateral squat walks     Excursion lunges     Wall sits     Hip 3 way with fingertip assist            ITB stretch with strap           SL hip abduction  2 10 B    SL hip abd iso with hip flexion/ext   10 B    Bridging with band Lime,   3 sec hold 2 10           Therapeutic Activities (30358)       Step ups 6\"      Heel taps (lat) 5/4: ant. Knee pain noted. Neuromuscular Re-ed (79897)       Soccer ball rolls A/P, M/L, CW/CCW  15 ea B    SLB     Gliders (fwd,lat)   10 ea B    SLB with ABCs  1 set B                   Manual Intervention (98472)       Consider end range hip PROM in the future              Roller (green) to the L glute and ITB  x10'     STM L lumbar  x4'                       Modalities:     Pt. Education:  -pt educated on diagnosis, prognosis and expectations for rehab  -all pt questions were answered    Home Exercise Program:  5/4:  Access Code: [email or web address]  URL: [email or web address]  Date: 05/04/2022  Prepared by: Candy Lopez    Exercises  Standing Repeated Hip Flexion with Ankle Weight - 1 x daily - 3-5 x weekly - 2 sets - 10 reps  Standing Hip Abduction - 1 x daily - 3-5 x weekly - 2 sets - 10 reps  Standing Hip Extension - 1 x daily - 3-5 x weekly - 2 sets - 10 reps      4/27: Access Code: [email or web address]  URL: [email or web address]  Date: 04/26/2022  Prepared by: Candy Lopez     Exercises  Prone Quadriceps Stretch with Strap - 1 x daily - 7 x weekly - 1 sets - 3 reps  Supine Hamstring Stretch with Strap - 1 x daily - 7 x weekly - 1 sets - 3 reps  Supine ITB Stretch with Strap - 1 x daily - 7 x weekly - 1 sets - 3 reps  Sidelying ITB Stretch off Table - 1 x daily - 7 x weekly - 1 sets - 3 reps  ITB Stretch at Wall - 1 x daily - 7 x weekly - 1 sets - 3 reps  Supine ITB Stretch - 1 x daily - 7 x weekly - 1 sets - 3 reps  Clamshell - 1 x daily - 7 x weekly - 3 sets - 10 reps       Therapeutic Exercise and NMR EXR  [x] (86050) Provided verbal/tactile cueing for activities related to strengthening, flexibility, endurance, ROM for improvements in LE, proximal hip, and core control with self care, mobility, lifting, ambulation.  [] (67221) Provided verbal/tactile cueing for activities related to improving balance, coordination, kinesthetic sense, posture, motor skill, proprioception  to assist with LE, proximal hip, and core control in self care, mobility, lifting, ambulation and eccentric single leg control.   [] (77406) Therapist is in constant attendance of 2 or more patients providing skilled therapy interventions, but not providing any significant amount of measurable one-on-one time to either patient, for improvements in LE, proximal hip, and core control in self care, mobility, lifting, ambulation and eccentric single leg control.      NMR and Therapeutic Activities:    [x] (02968 or 38785) Provided verbal/tactile cueing for activities related to improving balance, coordination, kinesthetic sense, posture, motor skill, proprioception and motor activation to allow for proper function of core, proximal hip and LE with self care and ADLs  [] (93918) Gait Re-education- Provided training and instruction to the patient for proper LE, core and proximal hip recruitment and positioning and eccentric body weight control with ambulation re-education including up and down stairs     Home Exercise Program:    [x] (50049) Reviewed/Progressed HEP activities related to strengthening, flexibility, endurance, ROM of core, proximal hip and LE for functional self-care, mobility, lifting and ambulation/stair navigation   [] (52326)Reviewed/Progressed HEP activities related to improving balance, coordination, kinesthetic sense, posture, motor skill, proprioception of core, proximal hip and LE for self care, mobility, lifting, and ambulation/stair navigation      Manual Treatments:  PROM / STM / Oscillations-Mobs:  G-I, II, III, IV (PA's, Inf., Post.)  [x] (83635) Provided manual therapy to mobilize LE, proximal hip and/or LS spine soft tissue/joints for the purpose of modulating pain, promoting relaxation,  increasing ROM, reducing/eliminating soft tissue swelling/inflammation/restriction, improving soft tissue extensibility and allowing for proper ROM for normal function with self care, mobility, lifting and ambulation. Modalities:  [] (09821) Vasopneumatic compression: Utilized vasopneumatic compression to decrease edema / swelling for the purpose of improving mobility and quad tone / recruitment which will allow for increased overall function including but not limited to self-care, transfers, ambulation, and ascending / descending stairs. Charges:  Timed Code Treatment Minutes: 43   Total Treatment Minutes: 43     [] EVAL - LOW (46525)   [] EVAL - MOD (33194)  [] EVAL - HIGH (27943)  [] RE-EVAL (51173)  [x] QU(55295) x  1   [] Ionto  [x] NMR (54544) x   1   [] Vaso  [x] Manual (33797) x  1    [] Ultrasound  [] TA x   1    [] Mech Traction (75845)  [] Aquatic Therapy x     [] ES (un) (87676):   [] Home Management Training x [] ES(attended) (20240)   [] Group:     [] Other:     GOALS:   Patient stated goal: To walk normally and loosed tight hip muscles. []? Progressing: []? Met: []? Not Met: []? Adjusted     Therapist goals for Patient:   Short Term Goals: To be achieved in: 2 weeks  1. Independent in HEP and progression per patient tolerance, in order to prevent re-injury. []? Progressing: []? Met: []? Not Met: []? Adjusted  2. Patient will have a decrease in pain to facilitate improvement in movement, function, and ADLs as indicated by Functional Deficits. []? Progressing: []? Met: []? Not Met: []? Adjusted     Long Term Goals: To be achieved in: 6 weeks  1. FOTO score of at least 65 to assist with reaching prior level of function. []? Progressing: []? Met: []? Not Met: []? Adjusted  2.  Patient will demonstrate increased PROM to full and pain-free with HS and HF flexibility WNL to allow for proper joint functioning as indicated by patients Functional Deficits. - Progressing: - Met: - Not Met: - Adjusted  3. Patient will demonstrate an increase in Strength to at least 5/5 as well as good proximal hip strength and control to allow for proper functional mobility as indicated by patients Functional Deficits. []? Progressing: []? Met: []? Not Met: []? Adjusted  4. Patient will return to functional activities including ascending and descending 12 6\" steps independnetly without increased symptoms or restriction. []? Progressing: []? Met: []? Not Met: []? Adjusted  5. Pt will tolerate 1 hour of ambulation or standing activity without increased symptoms. []? Progressing: []? Met: []? Not Met: []? Adjusted  6. Pt will report pain at rest is 0/10 and highest pain 1/10 for improved activity tolerance. []? Progressing: []? Met: []? Not Met: []? Adjusted          Overall Progression Towards Functional goals/ Treatment Progress Update:  [] Patient is progressing as expected towards functional goals listed. [] Progression is slowed due to complexities/Impairments listed. [] Progression has been slowed due to co-morbidities. [x] Plan just implemented, too soon to assess goals progression <30days   [] Goals require adjustment due to lack of progress  [] Patient is not progressing as expected and requires additional follow up with physician  [] Other    Persisting Functional Limitations/Impairments:  [x]Sitting [x]Standing   []Walking [x]Stairs   [x]Transfers []ADLs   [x]Squatting/bending []Kneeling  []Housework []Job related tasks  []Driving [x]Sports/Recreation   []Sleeping []Other:    ASSESSMENT:  Pt with reports of mild back pain after last session. Decreased duration of balance exercises and added STM to the lumbar region to improve tolerance. Will continue to progress as able.       Treatment/Activity Tolerance:  [] Pt able to complete treatment [x] Patient limited by prashanth  [] Patient limited by pain  [] Patient limited by other medical complications  [] Other:     Prognosis: [x] Good [] Fair  [] Poor    Patient Requires Follow-up: [x] Yes  [] No    Return to Play:    [x]  N/A   []  Stage 1: Intro to Strength   []  Stage 2: Return to Run and Strength   []  Stage 3: Return to Jump and Strength   []  Stage 4: Dynamic Strength and Agility   []  Stage 5: Sport Specific Training     []  Ready to Return to Play, Meets All Above Stages   []  Not Ready for Return to Sports   Comments:            PLAN: See eval. PT 2x / week for 6 weeks. [] Continue per plan of care [] Alter current plan (see comments)  [x] Plan of care initiated [] Hold pending MD visit [] Discharge    Electronically signed by: Ada Cartwright PT, DPT      Note: If patient does not return for scheduled/ recommended follow up visits, this note will serve as a discharge from care along with most recent update on progress.

## 2022-05-10 ENCOUNTER — HOSPITAL ENCOUNTER (OUTPATIENT)
Dept: PHYSICAL THERAPY | Age: 52
Setting detail: THERAPIES SERIES
End: 2022-05-10
Payer: COMMERCIAL

## 2022-05-13 ENCOUNTER — HOSPITAL ENCOUNTER (OUTPATIENT)
Dept: PHYSICAL THERAPY | Age: 52
Setting detail: THERAPIES SERIES
Discharge: HOME | End: 2022-05-13
Payer: COMMERCIAL

## 2022-05-13 PROCEDURE — 97140 MANUAL THERAPY 1/> REGIONS: CPT

## 2022-05-13 PROCEDURE — 97110 THERAPEUTIC EXERCISES: CPT

## 2022-05-13 PROCEDURE — 97112 NEUROMUSCULAR REEDUCATION: CPT

## 2022-05-13 NOTE — FLOWSHEET NOTE
[facility]  Phone: [phone]   Fax: [phone]    Physical Therapy Treatment Note/ Progress Report:     Date:  2022    Patient Name:  Che Ordoñez    :  1970  MRN: [id number]  Restrictions/Precautions:    Medical/Treatment Diagnosis Information:  · Diagnosis: M70.72 (ICD-10-CM) - Bursitis of left hip  · Treatment Diagnosis: Decreased strength in the hip (B), decreased neuromuscular control  Insurance/Certification information:  PT Insurance Information: UMR  Physician Information:   Katy Langley MD  Plan of care signed (Y/N): []  Yes [x]  No     Date of Patient follow up with Physician:      Progress Report: []  Yes  [x]  No     Date Range for reporting period:   Beginnin22  PN:  Ending:     Progress report due (10 Rx/or 30 days whichever is less): visit #10 or      Recertification due (POC duration/ or 90 days whichever is less): visit #12 or 22    Visit # Insurance Allowable Auth required? Date Range    60 combined, 10% co-insurance []  Yes  [x]  No NA       Latex Allergy:  [x]NO      []YES  Preferred Language for Healthcare:   [x]English       []other:    Functional Scale:           Date assessed:  [facility] physical FS primary measure score = 54; risk adjusted = 54  22    Pain level:  0/10     SUBJECTIVE:  Pt felt some spasm after last visit. But a few days last session, she felt stronger when cutting the grass. Going on hiking trip next week. OBJECTIVE:   : SLB 2-5 sec with excessive hip and knee strategies.         RESTRICTIONS/PRECAUTIONS: fibromyalgia    Exercises/Interventions:     Therapeutic Exercise (10663)  Resistance / level Sets/sec Reps Notes / Cues          Bike/dynamic warm up:  High knees  hacky sack kicks  Butt kicks  3 way HR    Bike     20  20  20  10 ea    4'    gastroc stretch  X 30 sec B     Lateral band walks    Monster walks    Lateral squat walks     Excursion lunges     Wall sits Hip 3 way with fingertip assist  2 10 B           ITB stretch with strap           SL hip abduction  2 10 B    SL hip abd iso with hip flexion/ext   10 B    Bridging with band Lime,   3 sec hold 2 10           Therapeutic Activities (45625)       Step ups 6\"      Heel taps (lat) 5/4: ant. Knee pain noted. Neuromuscular Re-ed (96894)       Soccer ball rolls A/P, M/L, CW/CCW  15 ea B    SLB     Gliders (fwd,lat)  -star  2  1 10 ea B  10 B    SLB with ABCs       Airex:  -fwd lunge  -lateral lunge    1  1   10 B  10 B    Piriformis stretch  2 x 30 sec     SKTC  2 x 30 sec     SB doorway stretch  2 x 30 sec            Manual Intervention (67747)       Consider end range hip PROM in the future              Roller (green) to the L glute and ITB  x8'     STM L lumbar                         Modalities:     Pt. Education:  -pt educated on diagnosis, prognosis and expectations for rehab  -all pt questions were answered    Home Exercise Program:  5/4:  Access Code: [email or web address]  URL: [email or web address]  Date: 05/04/2022  Prepared by: Jeane Robles    Exercises  Standing Repeated Hip Flexion with Ankle Weight - 1 x daily - 3-5 x weekly - 2 sets - 10 reps  Standing Hip Abduction - 1 x daily - 3-5 x weekly - 2 sets - 10 reps  Standing Hip Extension - 1 x daily - 3-5 x weekly - 2 sets - 10 reps      4/27: Access Code: [email or web address]  URL: [email or web address]  Date: 04/26/2022  Prepared by: Jeane Robles     Exercises  Prone Quadriceps Stretch with Strap - 1 x daily - 7 x weekly - 1 sets - 3 reps  Supine Hamstring Stretch with Strap - 1 x daily - 7 x weekly - 1 sets - 3 reps  Supine ITB Stretch with Strap - 1 x daily - 7 x weekly - 1 sets - 3 reps  Sidelying ITB Stretch off Table - 1 x daily - 7 x weekly - 1 sets - 3 reps  ITB Stretch at Wall - 1 x daily - 7 x weekly - 1 sets - 3 reps  Supine ITB Stretch - 1 x daily - 7 x weekly - 1 sets - 3 reps  Clamshell - 1 x daily - 7 x weekly - 3 sets - 10 reps       Therapeutic Exercise and NMR EXR  [x] (76066) Provided verbal/tactile cueing for activities related to strengthening, flexibility, endurance, ROM for improvements in LE, proximal hip, and core control with self care, mobility, lifting, ambulation.  [] (29596) Provided verbal/tactile cueing for activities related to improving balance, coordination, kinesthetic sense, posture, motor skill, proprioception  to assist with LE, proximal hip, and core control in self care, mobility, lifting, ambulation and eccentric single leg control.   [] (39572) Therapist is in constant attendance of 2 or more patients providing skilled therapy interventions, but not providing any significant amount of measurable one-on-one time to either patient, for improvements in LE, proximal hip, and core control in self care, mobility, lifting, ambulation and eccentric single leg control.      NMR and Therapeutic Activities:    [x] (63364 or 32883) Provided verbal/tactile cueing for activities related to improving balance, coordination, kinesthetic sense, posture, motor skill, proprioception and motor activation to allow for proper function of core, proximal hip and LE with self care and ADLs  [] (42461) Gait Re-education- Provided training and instruction to the patient for proper LE, core and proximal hip recruitment and positioning and eccentric body weight control with ambulation re-education including up and down stairs     Home Exercise Program:    [] (95180) Reviewed/Progressed HEP activities related to strengthening, flexibility, endurance, ROM of core, proximal hip and LE for functional self-care, mobility, lifting and ambulation/stair navigation   [] (21794)Reviewed/Progressed HEP activities related to improving balance, coordination, kinesthetic sense, posture, motor skill, proprioception of core, proximal hip and LE for self care, mobility, lifting, and ambulation/stair navigation      Manual Treatments:  PROM / STM / Oscillations-Mobs:  G-I, II, III, IV (PA's, Inf., Post.)  [x] (12125) Provided manual therapy to mobilize LE, proximal hip and/or LS spine soft tissue/joints for the purpose of modulating pain, promoting relaxation,  increasing ROM, reducing/eliminating soft tissue swelling/inflammation/restriction, improving soft tissue extensibility and allowing for proper ROM for normal function with self care, mobility, lifting and ambulation. Modalities:  [] (73887) Vasopneumatic compression: Utilized vasopneumatic compression to decrease edema / swelling for the purpose of improving mobility and quad tone / recruitment which will allow for increased overall function including but not limited to self-care, transfers, ambulation, and ascending / descending stairs. Charges:  Timed Code Treatment Minutes: 45   Total Treatment Minutes: 45     [] EVAL - LOW (33603)   [] EVAL - MOD (73574)  [] EVAL - HIGH (65413)  [] RE-EVAL (17043)  [x] EY(47353) x  1   [] Ionto  [x] NMR (67810) x   1   [] Vaso  [x] Manual (80036) x  1    [] Ultrasound  [] TA x   1    [] Mech Traction (22504)  [] Aquatic Therapy x     [] ES (un) (95716):   [] Home Management Training x [] ES(attended) (97023)   [] Group:     [] Other:     GOALS:   Patient stated goal: To walk normally and loosed tight hip muscles. []? Progressing: []? Met: []? Not Met: []? Adjusted     Therapist goals for Patient:   Short Term Goals: To be achieved in: 2 weeks  1. Independent in HEP and progression per patient tolerance, in order to prevent re-injury. []? Progressing: []? Met: []? Not Met: []? Adjusted  2. Patient will have a decrease in pain to facilitate improvement in movement, function, and ADLs as indicated by Functional Deficits. []? Progressing: []? Met: []? Not Met: []? Adjusted     Long Term Goals: To be achieved in: 6 weeks  1. FOTO score of at least 65 to assist with reaching prior level of function. []? Progressing: []? Met: []? Not Met: []? Adjusted  2. Patient will demonstrate increased PROM to full and pain-free with HS and HF flexibility WNL to allow for proper joint functioning as indicated by patients Functional Deficits. - Progressing: - Met: - Not Met: - Adjusted  3. Patient will demonstrate an increase in Strength to at least 5/5 as well as good proximal hip strength and control to allow for proper functional mobility as indicated by patients Functional Deficits. []? Progressing: []? Met: []? Not Met: []? Adjusted  4. Patient will return to functional activities including ascending and descending 12 6\" steps independnetly without increased symptoms or restriction. []? Progressing: []? Met: []? Not Met: []? Adjusted  5. Pt will tolerate 1 hour of ambulation or standing activity without increased symptoms. []? Progressing: []? Met: []? Not Met: []? Adjusted  6. Pt will report pain at rest is 0/10 and highest pain 1/10 for improved activity tolerance. []? Progressing: []? Met: []? Not Met: []? Adjusted          Overall Progression Towards Functional goals/ Treatment Progress Update:  [] Patient is progressing as expected towards functional goals listed. [] Progression is slowed due to complexities/Impairments listed. [] Progression has been slowed due to co-morbidities. [x] Plan just implemented, too soon to assess goals progression <30days   [] Goals require adjustment due to lack of progress  [] Patient is not progressing as expected and requires additional follow up with physician  [] Other    Persisting Functional Limitations/Impairments:  [x]Sitting [x]Standing   []Walking [x]Stairs   [x]Transfers []ADLs   [x]Squatting/bending []Kneeling  []Housework []Job related tasks  []Driving [x]Sports/Recreation   []Sleeping []Other:    ASSESSMENT:  Pt felt looser after warming up. Felt increased hip flexor soreness after exercises today. Added various hip flexion and abd stretches for Pt to perform on hiking trip.  Continue to progress hip strengthening and flexibility as Pt tolerates. Treatment/Activity Tolerance:  [x] Pt able to complete treatment [x] Patient limited by fatique  [] Patient limited by pain  [] Patient limited by other medical complications  [] Other:     Prognosis: [x] Good [] Fair  [] Poor    Patient Requires Follow-up: [x] Yes  [] No    Return to Play:    [x]  N/A   []  Stage 1: Intro to Strength   []  Stage 2: Return to Run and Strength   []  Stage 3: Return to Jump and Strength   []  Stage 4: Dynamic Strength and Agility   []  Stage 5: Sport Specific Training     []  Ready to Return to Play, Meets All Above Stages   []  Not Ready for Return to Sports   Comments:            PLAN: See eval. PT 2x / week for 6 weeks. [] Continue per plan of care [] Alter current plan (see comments)  [x] Plan of care initiated [] Hold pending MD visit [] Discharge    Electronically signed by: Lino Gonzalez, PT, DPT      Note: If patient does not return for scheduled/ recommended follow up visits, this note will serve as a discharge from care along with most recent update on progress.

## 2022-05-25 ENCOUNTER — HOSPITAL ENCOUNTER (OUTPATIENT)
Dept: PHYSICAL THERAPY | Age: 52
Setting detail: THERAPIES SERIES
Discharge: HOME | End: 2022-05-25
Payer: COMMERCIAL

## 2022-05-25 NOTE — FLOWSHEET NOTE
Physical Therapy  Cancellation/No-show Note  Patient Name:  Deysi Bain  :  1970   Date:  2022  Cancelled visits to date: 1  No-shows to date: 0    Patient status for today's appointment patient:  [x]  Cancelled   []  Rescheduled appointment  []  No-show      Reason given by patient:  []  Patient ill  []  Conflicting appointment  []  No transportation    []  Conflict with work  []  No reason given  [x]  Other:     Comments:  Pulled her back    Phone call information:   []  Phone call made today to patient at _ time at number provided:      []  Patient answered, conversation as follows:    []  Patient did not answer, message left as follows:  []  Phone call not made today  [x]  Phone call not needed - pt contacted us to cancel and provided reason for cancellation.      Electronically signed by:  Louis Peng, PT

## 2022-05-27 ENCOUNTER — APPOINTMENT (OUTPATIENT)
Dept: PHYSICAL THERAPY | Age: 52
End: 2022-05-27
Payer: COMMERCIAL

## 2022-05-31 ENCOUNTER — HOSPITAL ENCOUNTER (OUTPATIENT)
Dept: PHYSICAL THERAPY | Age: 52
Setting detail: THERAPIES SERIES
Discharge: HOME | End: 2022-05-31
Payer: COMMERCIAL

## 2022-05-31 NOTE — FLOWSHEET NOTE
Physical Therapy  Cancellation/No-show Note  Patient Name:  Kyle Lopez  :  1970   Date:  2022  Cancelled visits to date: 2  No-shows to date: 0    Patient status for today's appointment patient:  [x]  Cancelled ,   []  Rescheduled appointment  []  No-show      Reason given by patient:  []  Patient ill  []  Conflicting appointment  []  No transportation    []  Conflict with work  []  No reason given  [x]  Other:     Comments: Still recovering from pulled back. Phone call information:   []  Phone call made today to patient at _ time at number provided:      []  Patient answered, conversation as follows:    []  Patient did not answer, message left as follows:  []  Phone call not made today  [x]  Phone call not needed - pt contacted us to cancel and provided reason for cancellation.      Electronically signed by:  Jovi Agudelo PT

## 2022-06-03 ENCOUNTER — HOSPITAL ENCOUNTER (OUTPATIENT)
Dept: PHYSICAL THERAPY | Age: 52
Setting detail: THERAPIES SERIES
Discharge: HOME | End: 2022-06-03
Payer: COMMERCIAL

## 2022-06-03 NOTE — FLOWSHEET NOTE
Physical Therapy  Cancellation/No-show Note  Patient Name:  Lc Pérez  :  1970   Date:  6/3/2022  Cancelled visits to date: 3  No-shows to date: 0    Patient status for today's appointment patient:  [x]  Cancelled , , 6/3  []  Rescheduled appointment  []  No-show      Reason given by patient:  []  Patient ill  []  Conflicting appointment  []  No transportation    []  Conflict with work  []  No reason given  [x]  Other:     Comments: Still recovering from pulled back. Phone call information:   []  Phone call made today to patient at _ time at number provided:      []  Patient answered, conversation as follows:    []  Patient did not answer, message left as follows:  []  Phone call not made today  [x]  Phone call not needed - pt contacted us to cancel and provided reason for cancellation.      Electronically signed by:  Miguelito Crow PT

## 2022-06-07 ENCOUNTER — HOSPITAL ENCOUNTER (OUTPATIENT)
Dept: PHYSICAL THERAPY | Age: 52
Setting detail: THERAPIES SERIES
Discharge: HOME | End: 2022-06-07
Payer: COMMERCIAL

## 2022-06-07 NOTE — FLOWSHEET NOTE
Physical Therapy  Cancellation/No-show Note  Patient Name:  Jacquelyn Mojica  :  1970   Date:  2022  Cancelled visits to date: 4  No-shows to date: 0    Patient status for today's appointment patient:  [x]  Cancelled , , 6/3,   []  Rescheduled appointment  []  No-show      Reason given by patient:  []  Patient ill  []  Conflicting appointment  []  No transportation    []  Conflict with work  []  No reason given  [x]  Other:     Comments: Still recovering from pulled back. PT discussed new pain with pt and advised pt to get a new referral from physician to add lumbar to the POC, will assess on Friday and determine if able to assist or if further physician assessment is required. Hip has been doing well but hip stretches increase lumbar symptoms. Phone call information:   []  Phone call made today to patient at _ time at number provided:      []  Patient answered, conversation as follows:    []  Patient did not answer, message left as follows:  []  Phone call not made today  [x]  Phone call not needed - pt contacted us to cancel and provided reason for cancellation.      Electronically signed by:  Johana Weiss, PT, DPT

## 2022-06-10 ENCOUNTER — APPOINTMENT (OUTPATIENT)
Dept: PHYSICAL THERAPY | Age: 52
End: 2022-06-10
Payer: COMMERCIAL

## 2022-06-14 ENCOUNTER — HOSPITAL ENCOUNTER (OUTPATIENT)
Dept: PHYSICAL THERAPY | Age: 52
Setting detail: THERAPIES SERIES
Discharge: HOME | End: 2022-06-14
Payer: COMMERCIAL

## 2022-06-14 PROCEDURE — 97530 THERAPEUTIC ACTIVITIES: CPT

## 2022-06-14 NOTE — FLOWSHEET NOTE
[address] [facility]  Phone: [phone]   Fax: [phone]     Physical Therapy Discharge Summary    Dear Kenyetta Richardson *  ,    We had the pleasure of treating the following patient for physical therapy services at [facility]. A summary of our findings can be found in the discharge summary below. If you have any questions or concerns regarding these findings, please do not hesitate to contact me at the office phone number checked above. Thank you for the referral.     Physician Signature:________________________________Date:__________________  By signing above (or electronic signature), therapists plan is approved by physician      Functional Outcome:   FOTO physical FS primary measure score = 70; risk adjusted = 54  6/14/22    Strength (0-5) / Myotomes Left Right   Hip Flexion - supine 4 4   Hip Flexion - seated (L1-2) 4+ 4+   Hip Abduction 4+ 4+   Hip  4+ 4+   Hip Adduction       Hip ER       Hip IR       Quads (L2-4) 4+ 4+   Hamstrings 4+ 4+   Ankle Dorsiflexion (L4-5)       Ankle Plantarflexion (S1-2)       Ankle Inversion       Ankle Eversion (S1-2)       Great Toe Extension (L5)                   Overall Response to Treatment:   [x]Patient is responding well to treatment and improvement is noted with regards  to goals   []Patient should continue to improve in reasonable time if they continue HEP   []Patient has plateaued and is no longer responding to skilled PT intervention    []Patient is getting worse and would benefit from return to referring MD   []Patient unable to adhere to initial POC   [x]Other: Pt has met all goals. No longer limited by hip pain. Pt is now d/c to HEP. Pt recently had an exacerbation of back pain. Has been self managing back pain, will get a referral if self management unsuccessful.      Date range of Visits: 4/26/22-6/14/22  Total Visits: 6    Recommendation:    [x] Discharge to HEP. Follow up with PT or MD PRN. Physical Therapy Treatment Note/ Progress Report:     Date:  2022    Patient Name:  Pia Combs    :  1970  MRN: [id number]  Restrictions/Precautions:    Medical/Treatment Diagnosis Information:  · Diagnosis: M70.72 (ICD-10-CM) - Bursitis of left hip  · Treatment Diagnosis: Decreased strength in the hip (B), decreased neuromuscular control  Insurance/Certification information:  PT Insurance Information: R  Physician Information:   Carolyne Farley MD  Plan of care signed (Y/N): []  Yes [x]  No     Date of Patient follow up with Physician:      Progress Report: []  Yes  [x]  No     Date Range for reporting period:   Beginnin22  Endin22    Progress report due (10 Rx/or 30 days whichever is less): visit #10 or 44     Recertification due (POC duration/ or 90 days whichever is less): visit #12 or 22    Visit # Insurance Allowable Auth required? Date Range    60 combined, 10% co-insurance []  Yes  [x]  No NA       Latex Allergy:  [x]NO      []YES  Preferred Language for Healthcare:   [x]English       []other:    Functional Scale:           Date assessed:  FOTO physical FS primary measure score = 54; risk adjusted = 54  22  FOTO physical FS primary measure score = 70; risk adjusted = 54  22    Pain level:  0-2/10 at rest, 0-1/10 with movement (sitting causes tightness and stiffness but getting up and moving is better)     SUBJECTIVE:  Pt had to take a month break from therapy as she had a flare up of back pain that was intense. She has not been able to complete the hip exercises d/t the back pain but even so that pain in the hip has not been bad lately. Noticed that when she was riding her bike she was having some soreness in the knees and back but feels that getting back to the home exercises will alleviate this. Pt's back pain is significantly worsened by prolonged sitting at work.   Pt's set up is higher and her feet do not touch so she has to use a set stool. After a full day of work the back pain is high. Plans to hold off on back eval for therapy but has an appointment with primary care in a week in case symptoms persist.      OBJECTIVE:   4/29: SLB 2-5 sec with excessive hip and knee strategies. 6/14:  Strength (0-5) / Myotomes Left Right   Hip Flexion - supine 4-* 4   Hip Flexion - seated (L1-2) 4+ 4+   Hip Abduction 4-* 4+   Hip  4+ 4+   Hip Adduction       Hip ER       Hip IR       Quads (L2-4) 4+ 4+   Hamstrings 4+ 4+   Ankle Dorsiflexion (L4-5)       Ankle Plantarflexion (S1-2)       Ankle Inversion       Ankle Eversion (S1-2)       Great Toe Extension (L5)               Flexibility       Hamstrings (90/90) Lacking 20 deg Lacking 15 deg   ITB (Yadi)       Quads (Ely's) Mod decrease Mod decrease         RESTRICTIONS/PRECAUTIONS: fibromyalgia    Exercises/Interventions:     Therapeutic Exercise (88069)  Resistance / level Sets/sec Reps Notes / Cues          Bike/dynamic warm up:  High knees  hacky sack kicks  Butt kicks  3 way HR    Bike    gastroc stretch    Lateral band walks    Monster walks    Lateral squat walks    Excursion lunges    Wall sits    Hip 3 way with fingertip assist        ITB stretch with strap        SL hip abduction    SL hip abd iso with hip flexion/ext    Bridging with band           Therapeutic Activities (51208)       Step ups 6\"      Heel taps (lat) 5/4: ant. Knee pain noted.     PN  x25'            Neuromuscular Re-ed (93764)       Soccer ball rolls    SLB    Gliders (fwd,lat)  -star    SLB with ABCs    Airex:  -fwd lunge  -lateral lunge    Piriformis stretch    SKTC    SB doorway stretch           Manual Intervention ([address] [facility])       Consider end range hip PROM in the future              Roller (green) to the L glute and ITB       STM L lumbar                         Modalities:     Pt. Education:  -pt educated on diagnosis, prognosis and expectations for rehab  -all pt questions were answered    Home Exercise Program:  5/4:  Access Code: [email or web address]  URL: [email or web address]  Date: 05/04/2022  Prepared by: Amber Escobar    Exercises  Standing Repeated Hip Flexion with Ankle Weight - 1 x daily - 3-5 x weekly - 2 sets - 10 reps  Standing Hip Abduction - 1 x daily - 3-5 x weekly - 2 sets - 10 reps  Standing Hip Extension - 1 x daily - 3-5 x weekly - 2 sets - 10 reps      4/27: Access Code: [email or web address]  URL: [email or web address]  Date: 04/26/2022  Prepared by: Amber Escobar     Exercises  Prone Quadriceps Stretch with Strap - 1 x daily - 7 x weekly - 1 sets - 3 reps  Supine Hamstring Stretch with Strap - 1 x daily - 7 x weekly - 1 sets - 3 reps  Supine ITB Stretch with Strap - 1 x daily - 7 x weekly - 1 sets - 3 reps  Sidelying ITB Stretch off Table - 1 x daily - 7 x weekly - 1 sets - 3 reps  ITB Stretch at Wall - 1 x daily - 7 x weekly - 1 sets - 3 reps  Supine ITB Stretch - 1 x daily - 7 x weekly - 1 sets - 3 reps  Clamshell - 1 x daily - 7 x weekly - 3 sets - 10 reps       Therapeutic Exercise and NMR EXR  [x] (67137) Provided verbal/tactile cueing for activities related to strengthening, flexibility, endurance, ROM for improvements in LE, proximal hip, and core control with self care, mobility, lifting, ambulation.  [] (93915) Provided verbal/tactile cueing for activities related to improving balance, coordination, kinesthetic sense, posture, motor skill, proprioception  to assist with LE, proximal hip, and core control in self care, mobility, lifting, ambulation and eccentric single leg control.   [] (51450) Therapist is in constant attendance of 2 or more patients providing skilled therapy interventions, but not providing any significant amount of measurable one-on-one time to either patient, for improvements in LE, proximal hip, and core control in self care, mobility, lifting, ambulation and eccentric single leg control.      NMR and Therapeutic Activities:    [x] (03135 or 56015) Provided verbal/tactile cueing for activities related to improving balance, coordination, kinesthetic sense, posture, motor skill, proprioception and motor activation to allow for proper function of core, proximal hip and LE with self care and ADLs  [] (77021) Gait Re-education- Provided training and instruction to the patient for proper LE, core and proximal hip recruitment and positioning and eccentric body weight control with ambulation re-education including up and down stairs     Home Exercise Program:    [] (69326) Reviewed/Progressed HEP activities related to strengthening, flexibility, endurance, ROM of core, proximal hip and LE for functional self-care, mobility, lifting and ambulation/stair navigation   [] (13890)Reviewed/Progressed HEP activities related to improving balance, coordination, kinesthetic sense, posture, motor skill, proprioception of core, proximal hip and LE for self care, mobility, lifting, and ambulation/stair navigation      Manual Treatments:  PROM / STM / Oscillations-Mobs:  G-I, II, III, IV (PA's, Inf., Post.)  [x] (30214) Provided manual therapy to mobilize LE, proximal hip and/or LS spine soft tissue/joints for the purpose of modulating pain, promoting relaxation,  increasing ROM, reducing/eliminating soft tissue swelling/inflammation/restriction, improving soft tissue extensibility and allowing for proper ROM for normal function with self care, mobility, lifting and ambulation. Modalities:  [] (52709) Vasopneumatic compression: Utilized vasopneumatic compression to decrease edema / swelling for the purpose of improving mobility and quad tone / recruitment which will allow for increased overall function including but not limited to self-care, transfers, ambulation, and ascending / descending stairs.        Charges:  Timed Code Treatment Minutes: 25   Total Treatment Minutes: 25     [] EVAL - LOW (52432)   [] EVAL - MOD (01214)  [] EVAL - HIGH (82527)  [] RE-EVAL (26719)  [] KR(72703) x  1   [] Ionto  [] NMR (06642) x   1   [] Vaso  [] Manual (81925) x  1    [] Ultrasound  [x] TA x   2    [] Mech Traction (90546)  [] Aquatic Therapy x     [] ES (un) (78390):   [] Home Management Training x [] ES(attended) (46243)   [] Group:     [] Other:     GOALS:   Patient stated goal: To walk normally and loosed tight hip muscles. []? Progressing: []? Met: []? Not Met: []? Adjusted     Therapist goals for Patient:   Short Term Goals: To be achieved in: 2 weeks  1. Independent in HEP and progression per patient tolerance, in order to prevent re-injury. []? Progressing: []? Met: []? Not Met: []? Adjusted  2. Patient will have a decrease in pain to facilitate improvement in movement, function, and ADLs as indicated by Functional Deficits. []? Progressing: []? Met: []? Not Met: []? Adjusted     Long Term Goals: To be achieved in: 6 weeks  1. FOTO score of at least 65 to assist with reaching prior level of function. []? Progressing: [x]? Met: []? Not Met: []? Adjusted  2. Patient will demonstrate increased PROM to full and pain-free with HS and HF flexibility WNL to allow for proper joint functioning as indicated by patients Functional Deficits. - Progressing: - Met: - Not Met: - Adjusted  3. Patient will demonstrate an increase in Strength to at least 5/5 as well as good proximal hip strength and control to allow for proper functional mobility as indicated by patients Functional Deficits. [x]? Progressing: []? Met: []? Not Met: []? Adjusted  4. Patient will return to functional activities including ascending and descending 12 6\" steps independnetly without increased symptoms or restriction. (able to complete without issue)  []? Progressing: [x]? Met: []? Not Met: []? Adjusted  5. Pt will tolerate 1 hour of ambulation or standing activity without increased symptoms. []? Progressing: [x]? Met: []? Not Met: []? Adjusted  6.  Pt will report pain at rest is 0/10 and highest pain 1/10

## 2022-06-17 ENCOUNTER — APPOINTMENT (OUTPATIENT)
Dept: PHYSICAL THERAPY | Age: 52
End: 2022-06-17
Payer: COMMERCIAL

## 2022-06-21 ENCOUNTER — APPOINTMENT (OUTPATIENT)
Dept: PHYSICAL THERAPY | Age: 52
End: 2022-06-21
Payer: COMMERCIAL

## 2022-06-23 ENCOUNTER — APPOINTMENT (OUTPATIENT)
Dept: PHYSICAL THERAPY | Age: 52
End: 2022-06-23
Payer: COMMERCIAL

## 2022-11-28 ENCOUNTER — HOSPITAL ENCOUNTER (OUTPATIENT)
Dept: PHYSICAL THERAPY | Age: 52
Setting detail: THERAPIES SERIES
Discharge: HOME | End: 2022-11-28
Payer: COMMERCIAL

## 2022-11-28 PROCEDURE — 97110 THERAPEUTIC EXERCISES: CPT

## 2022-11-28 PROCEDURE — 97161 PT EVAL LOW COMPLEX 20 MIN: CPT

## 2022-11-28 PROCEDURE — 97530 THERAPEUTIC ACTIVITIES: CPT

## 2022-11-28 NOTE — PLAN OF CARE
[address] [facility], [address]  Phone: [phone]   Fax: [phone]                                                       Physical Therapy Certification    Dear Chhaya Lugo,*  ,    We had the pleasure of evaluating the following patient for physical therapy services at [address] [place] [address]. A summary of our findings can be found in the initial assessment below. This includes our plan of care. If you have any questions or concerns regarding these findings, please do not hesitate to contact me at the office phone number checked above. Thank you for the referral.       Physician Signature:_______________________________Date:__________________  By signing above (or electronic signature), therapists plan is approved by physician      Patient: Stefani Messina   : 1970   MRN: [id number]  Referring Physician: Chhaya Lugo,*        Evaluation Date: 2022      Medical Diagnosis Information:  Plantar fascial fibromatosis [M72.2]  Achilles tendinitis, right leg [M76.61]   PT diagnosis:  Difficult walking, Limited dorsi right ankle Flexion , low activity tolerance in standing, soft tissue restriction at right soleus                                    Insurance information: PT Insurance Information: UMR     Precautions/ Contra-indications:   Latex Allergy:  [x]NO      []YES  Preferred Language for Healthcare:   [x]English       []Other:    C-SSRS Triggered by Intake questionnaire (Past 2 wk assessment ):   [x] No, Questionnaire did not trigger screening.   [] Yes, Patient intake triggered C-SSRS Screening     [] Completed, no further action required. [] Completed, PCP notified via Epic    SUBJECTIVE: Patient stated complaint: Patient reports that she began having pain tightness in plantar surface of the right foot.  States that she had a similar situation which lasted 18 months with left foot. Currently walking and ambulating  long distances aggravates right foot . Relevant Medical History:  Left toe fracture,  2014  modified Bunionectomy of 1st ray, L4-5 discectomy   Functional Outcome: FOTO physical FS primary measure score = 47; Risk adjusted = 52    Pain Scale: 3-8/10  Easing factors: sitting  Provocative factors:  walking, prolong standing     Type: []Constant   [x]Intermittent  []Radiating []Localized []other:     Numbness/Tingling: mild N/T right foot    Occupation/School: Administration     Living Status/Prior Level of Function:Prior to this injury / incident, pt was independent with ADLs and IADLs,  Patient lives with spouse in quad / multilevel home  with steps inside. Prior to the spring of 22\" pt had no limitations mobility.        OBJECTIVE:   Palpation: Tenderness midfoot(dorsum surface) and plantar surface medial border, medial soleus    Functional Mobility/Transfers: Indep    Posture: low arch in B feet excessive pronation at right foot    Bandages/Dressings/Incisions: NA    Gait: (include devices/WB status)  ambulates with decrease toe off on left foot    Dermatomes Normal Abnormal Comments   inguinal area (L1)       anterior mid-thigh (L2)      distal ant thigh/med knee (L3)      medial lower leg and foot (L4)  x Right leg   lateral lower leg and foot (L5)  x Right leg   posterior calf (S1)      medial calcaneus (S2)        Lumbar AROM screen: [x] [place]  [] abnormal:     PROM AROM    L R L R   Hip Flexion WFL all WFL all WFL all WFL all   Hip Abduction       Hip ER       Hip IR       Knee Flexion       Knee Extension       Dorsiflexion     14   Plantarflexion     60   Inversion        Eversion            Strength (0-5) / Myotomes Left Right   Hip Flexion - supine     Hip Flexion - seated (L1-2) +4 4   Hip Abduction     Hip Adduction     Hip ER +4 4   Hip IR +4 4   Quads (L2-4) +4 4   Hamstrings +4 4   Ankle Dorsiflexion (L4-5) 5 5   Ankle (E66.01)  []Diabetes type 1(E10.65) or 2 (E11.65)   []Neuropathy (G60.9)     Cardio/Pulmonary conditions   []Asthma (J45)  []Coughing   []COPD (J44.9)  []CHF  []A-fib   Psychological Disorders  []Anxiety (F41.9)  []Depression (F32.9)   []Other:   Developmental Disorders  []Autism (F84.0)  []CP (G80)  []Down Syndrome (Q90.9)  []Developmental delay     Neurological conditions  []Prior Stroke (I69.30)  []Parkinson's (G20)  []Encephalopathy (G93.40)  []MS (G35)  []Post-polio (G14)  []SCI  []TBI  []ALS Other conditions  []Fibromyalgia (M79.7)  []Vertigo  []Syncope  []Kidney Failure  []Cancer      []currently undergoing                treatment  []Pregnancy  []Incontinence   Prior surgeries  []involved limb  []previous spinal surgery  [] section birth  []hysterectomy  []bowel / bladder surgery  []other relevant surgeries   [x]Other:   Left toe fracture,    modified Bunionectomy of 1st ray, L4-5 discectomy            Barriers to/and or personal factors that will affect rehab potential:              []Age  []Sex    []Smoker              []Motivation/Lack of Motivation                        [x]Co-Morbidities              []Cognitive Function, education/learning barriers              []Environmental, home barriers              []profession/work barriers  []past PT/medical experience  []other:  Justification: Recent left fracture of toe    Falls Risk Assessment (30 days):   [x] Falls Risk assessed and no intervention required. [] Falls Risk assessed and Patient requires intervention due to being higher risk   TUG score (>12s at risk):     [] Falls education provided, including        ASSESSMENT: Patient is a 48 yo female who presents with limited DF of right ankle , STM restriction at right ankle joint , limited walking and standing tolerance. She demonstrates mild imbalance due to poor gait mechanics. The patient to benefit from skilled PT to further address these deficits to improve and restore normal gait mechanics and reduce Soft tissue irritation. Functional Impairments:     []Noted lumbar/proximal hip/LE joint hypomobility   [x]Decreased LE functional ROM   []Decreased core/proximal hip strength and neuromuscular control   []Decreased LE functional strength   []Reduced balance/proprioceptive control   []other:      Functional Activity Limitations (from functional questionnaire and intake)   []Reduced ability to tolerate prolonged functional positions   [x]Reduced ability or difficulty with changes of positions or transfers between positions   []Reduced ability to maintain good posture and demonstrate good body mechanics with sitting, bending, and lifting   []Reduced ability to sleep   [] Reduced ability or tolerance with driving and/or computer work   [x]Reduced ability to perform lifting, carrying tasks   [x]Reduced ability to squat   []Reduced ability to forward bend   []Reduced ability to ambulate prolonged functional periods/distances/surfaces   [x]Reduced ability to ascend/descend stairs   []Reduced ability to run, hop, cut or jump   []other:    Participation Restrictions   []Reduced participation in self care activities   []Reduced participation in home management activities   []Reduced participation in work activities   [x]Reduced participation in social activities. [x]Reduced participation in sport/recreation activities. Classification :    []Signs/symptoms consistent with post-surgical status including decreased ROM, strength and function.    []Signs/symptoms consistent with joint sprain/strain   []Signs/symptoms consistent with patella-femoral syndrome   []Signs/symptoms consistent with knee OA/hip OA   []Signs/symptoms consistent with internal derangement of knee/Hip   []Signs/symptoms consistent with functional hip weakness/NMR control      [x]Signs/symptoms consistent with tendinitis/tendinosis    []signs/symptoms consistent with pathology which may benefit from Dry needling      []other: Prognosis/Rehab Potential:      [x]Excellent   []Good    []Fair   []Poor    Tolerance of evaluation/treatment:    [x]Excellent   []Good    []Fair   []Poor    Physical Therapy Evaluation Complexity Justification  [x] A history of present problem with:  [] no personal factors and/or comorbidities that impact the plan of care;  [x]1-2 personal factors and/or comorbidities that impact the plan of care  []3 personal factors and/or comorbidities that impact the plan of care  [x] An examination of body systems using standardized tests and measures addressing any of the following: body structures and functions (impairments), activity limitations, and/or participation restrictions;:  [x] a total of 1-2 or more elements   [] a total of 3 or more elements   [] a total of 4 or more elements   [x] A clinical presentation with:  [x] stable and/or uncomplicated characteristics   [] evolving clinical presentation with changing characteristics  [] unstable and unpredictable characteristics;   [x] Clinical decision making of [x] Low, [] moderate, [] high complexity using standardized patient assessment instrument and/or measurable assessment of functional outcome. [x] EVAL (LOW) 76903 (typically 15 minutes face-to-face)  [] EVAL (MOD) 51149 (typically 30 minutes face-to-face)  [] EVAL (HIGH) 58540 (typically 45 minutes face-to-face)  [] RE-EVAL     PLAN:   Frequency/Duration:  2 days per week for 6 Weeks:  Interventions:  [x]  Therapeutic exercise including: strength training, ROM, for Lower extremity and core   [x]  NMR activation and proprioception for LE, Glutes and Core   [x]  Manual therapy as indicated for LE, Hip and spine to include: Dry Needling/IASTM, STM, PROM, Gr I-IV mobilizations, manipulation.    [x] Modalities as needed that may include: thermal agents, E-stim, Biofeedback, US, iontophoresis as indicated  [x] Patient education on joint protection, postural re-education, activity modification, progression of HEP.    HEP instruction: Written HEP instructions provided and reviewed. GOALS:  Patient stated goal: To improve walking   [] Progressing: [] Met: [] Not Met: [] Adjusted    Therapist goals for Patient:   Short Term Goals: To be achieved in: 2 weeks  1. Independent in HEP and progression per patient tolerance, in order to prevent re-injury. [] Progressing: [] Met: [] Not Met: [] Adjusted  2. Patient will have a decrease in pain to facilitate improvement in movement, function, and ADLs as indicated by Functional Deficits. [] Progressing: [] Met: [] Not Met: [] Adjusted    Long Term Goals: To be achieved in: 6 weeks  Patient to improve FOTO score of at least  to assist with reaching prior level of function. [] Progressing: [] Met: [] Not Met: [] Adjusted  2. Patient will demonstrate increased right ankle DF AROM by 5deg to allow for proper joint functioning as indicated by patients Functional Deficits. [] Progressing: [] Met: [] Not Met: [] Adjusted  3. Patient will demonstrate an increase in Strength to at least  as well as good proximal hip strength and control to allow for proper functional mobility as indicated by patients Functional Deficits. [] Progressing: [] Met: [] Not Met: [] Adjusted  4. Patient will return to functional activities including ambulating on all surfaces without increased symptoms or restriction. [] Progressing: [] Met: [] Not Met: [] Adjusted  5. Patient to tolerate standing > 30 to carryout daily functional task.      [] Progressing: [] Met: [] Not Met: [] Adjusted     Electronically signed by:  Cathy Rivera, PT , DPT, OMT-C

## 2022-11-29 NOTE — FLOWSHEET NOTE
[address] [facility] Physical Therapy  Phone: [phone]   Fax: [phone]    Physical Therapy Daily Treatment Note    Date:  2022     Patient Name:  Lora Shepard    :  1970  MRN: [id number]  Medical Diagnosis:  Plantar fascial fibromatosis [M72.2]  Achilles tendinitis, right leg [M76.61]  Treatment Diagnosis: Difficult walking, Limited dorsi right ankle Flexion , low activity tolerance in standing, soft tissue restriction at right soleus                                    Insurance/Certification information:  PT Insurance Information: R  Physician Information:  Sangita Cavazos,*    Plan of care signed (Y/N): []  Yes [x]  No     Date of Patient follow up with Physician:      Progress Report: []  Yes  [x]  No     Date Range for reporting period:  Beginnin2022  Ending:     Progress report due (10 Rx/or 30 days whichever is less): visit #36 or       Recertification due (POC duration/ or 90 days whichever is less): visit # or  (date)     Visit # Insurance Allowable Auth required?  Date Range    [place] []  Yes  [x]  No NA           Latex Allergy:  [x]NO      []YES  Preferred Language for Healthcare:   [x]English       []other:    Functional Scale:           Date assessed:  TO physical FS primary measure score = 47; risk adjusted = 52  2022    Pain level:  3-8/10     SUBJECTIVE:  See eval    OBJECTIVE: See eval      RESTRICTIONS/PRECAUTIONS:     Exercises/Interventions:     Therapeutic Exercises (10917) Resistance / level Sets/sec Reps Notes   Nustep/ bike       IB/ HR       Soleus stretch                                                 Therapeutic Activities (39009)                                   Gait (11082)                                   Neuromuscular Re-ed (40713)       Arch raise/ leah exercise                                          Manual Intervention (12208)                                                     Modalities:     Pt. Education:  11/28/2022  -patient educated on diagnosis, prognosis and expectations for rehab  -all patient questions were answered    Home Exercise Program:         Access Code: [email or web address]  URL: [email or web address]  Date: 11/28/2022  Prepared by: Jorge Chavez    Exercises  Long Sitting Calf Stretch with Strap - 1 x daily - 7 x weekly - 1 sets - 2 reps - 20s hold  Arch Lifting - 1 x daily - 7 x weekly - 1 sets - 10 reps  Seated [place] Pick-Up with Toes - 1 x daily - 7 x weekly - 1 sets - 10 reps                                Therapeutic Exercise and NMR EXR  [x] (19819) Provided verbal/tactile cueing for activities related to strengthening, flexibility, endurance, ROM for improvements in  [] LE / Lumbar: LE, proximal hip, and core control with self care, mobility, lifting, ambulation. [] UE / Cervical: cervical, postural, scapular, scapulothoracic and UE control with self care, reaching, carrying, lifting, house/yardwork, driving, computer work.  [] (11920) Provided verbal/tactile cueing for activities related to improving balance, coordination, kinesthetic sense, posture, motor skill, proprioception to assist with   [] LE / lumbar: LE, proximal hip, and core control in self care, mobility, lifting, ambulation and eccentric single leg control. [] UE / cervical: cervical, scapular, scapulothoracic and UE control with self care, reaching, carrying, lifting, house/yardwork, driving, computer work.   [] (82653) Therapist is in constant attendance of 2 or more patients providing skilled therapy interventions, but not providing any significant amount of measurable one-on-one time to either patient, for improvements in  [] LE / lumbar: LE, proximal hip, and core control in self care, mobility, lifting, ambulation and eccentric single leg control. [] UE / cervical: cervical, scapular, scapulothoracic and UE control with self care, reaching, carrying, lifting, house/yardwork, driving, computer work.      NMR and Therapeutic Activities:    [] (40794 or 75793) Provided verbal/tactile cueing for activities related to improving balance, coordination, kinesthetic sense, posture, motor skill, proprioception and motor activation to allow for proper function of   [] LE: / Lumbar core, proximal hip and LE with self care and ADLs  [] UE / Cervical: cervical, postural, scapular, scapulothoracic and UE control with self care, carrying, lifting, driving, computer work.   [] (51088) Gait Re-education- Provided training and instruction to the patient for proper LE, core and proximal hip recruitment and positioning and eccentric body weight control with ambulation re-education including up and down stairs     Home Management Training / Self Care:  [] (08733) Provided self-care/home management training related to activities of daily living and compensatory training, and/or use of adaptive equipment for improvement with: ADLs and compensatory training, meal preparation, safety procedures and instruction in use of adaptive equipment, including bathing, grooming, dressing, personal hygiene, basic household cleaning and chores.      Home Exercise Program:    [x] (97301) Reviewed/Progressed HEP activities related to strengthening, flexibility, endurance, ROM of   [] LE / Lumbar: core, proximal hip and LE for functional self-care, mobility, lifting and ambulation/stair navigation   [] UE / Cervical: cervical, postural, scapular, scapulothoracic and UE control with self care, reaching, carrying, lifting, house/yardwork, driving, computer work  [] (62037)Reviewed/Progressed HEP activities related to improving balance, coordination, kinesthetic sense, posture, motor skill, proprioception of   [] LE: core, proximal hip and LE for self care, mobility, lifting, and ambulation/stair navigation    [] UE / Cervical: cervical, postural,  scapular, scapulothoracic and UE control with self care, reaching, carrying, lifting, house/yardwork, driving, computer work    Manual Treatments:  PROM / STM / Oscillations-Mobs:  G-I, II, III, IV (PA's, Inf., Post.)  [] (95056) Provided manual therapy to mobilize LE, proximal hip and/or LS spine soft tissue/joints for the purpose of modulating pain, promoting relaxation,  increasing ROM, reducing/eliminating soft tissue swelling/inflammation/restriction, improving soft tissue extensibility and allowing for proper ROM for normal function with   [] LE / lumbar: self care, mobility, lifting and ambulation. [] UE / Cervical: self care, reaching, carrying, lifting, house/yardwork, driving, computer work. Modalities:  [] (77777) Vasopneumatic compression: Utilized vasopneumatic compression to decrease edema / swelling for the purpose of improving mobility and quad tone / recruitment which will allow for increased overall function including but not limited to self-care, transfers, ambulation, and ascending / descending stairs. Charges:  Timed Code Treatment Minutes: 25   Total Treatment Minutes: 42     [] EVAL - LOW (59658)   [] EVAL - MOD (53557)  [x] EVAL - HIGH (79657)  [] RE-EVAL (94735)  [x] II(98032) x       [] Ionto  [] NMR (36482) x       [] Vaso  [] Manual (20257) x       [] Ultrasound  [x] TA x        [] Mech Traction (94130)  [] Aquatic Therapy x     [] ES (un) (28670):   [] Home Management Training x  [] ES(attended) (55053)   [] Dry Needling 1-2 muscles (91136):  [] Dry Needling 3+ muscles (223540)  [] Group:      [] Other:     GOALS:    Patient stated goal: To improve walking   [] Progressing: [] Met: [] Not Met: [] Adjusted     Therapist goals for Patient:   Short Term Goals: To be achieved in: 2 weeks  1. Independent in HEP and progression per patient tolerance, in order to prevent re-injury. [] Progressing: [] Met: [] Not Met: [] Adjusted  2. Patient will have a decrease in pain to facilitate improvement in movement, function, and ADLs as indicated by Functional Deficits.   [] Progressing: [] Met: [] Not Met: [] Adjusted     Long Term Goals: To be achieved in: 6 weeks  Patient to improve FOTO score of at least  to assist with reaching prior level of function. [] Progressing: [] Met: [] Not Met: [] Adjusted  2. Patient will demonstrate increased right ankle DF AROM by 5deg to allow for proper joint functioning as indicated by patients Functional Deficits. [] Progressing: [] Met: [] Not Met: [] Adjusted  3. Patient will demonstrate an increase in Strength to at least  as well as good proximal hip strength and control to allow for proper functional mobility as indicated by patients Functional Deficits. [] Progressing: [] Met: [] Not Met: [] Adjusted  4. Patient will return to functional activities including ambulating on all surfaces without increased symptoms or restriction. [] Progressing: [] Met: [] Not Met: [] Adjusted  5. Patient to tolerate standing > 30 to carryout daily functional task. [] Progressing: [] Met: [] Not Met: [] Adjusted         Overall Progression Towards Functional goals/ Treatment Progress Update:  [] Patient is progressing as expected towards functional goals listed. [] Progression is slowed due to complexities/Impairments listed. [] Progression has been slowed due to co-morbidities.   [x] Plan just implemented, too soon to assess goals progression <30days   [] Goals require adjustment due to lack of progress  [] Patient is not progressing as expected and requires additional follow up with physician  [] Other    Persisting Functional Limitations/Impairments:  []Sleeping []Sitting               [x]Standing []Transfers        [x]Walking []Kneeling               []Stairs []Squatting / bending   []ADLs []Reaching  []Lifting  []Housework  []Driving []Job related tasks  []Sports/Recreation []Other:        ASSESSMENT:  See eval  Treatment/Activity Tolerance:  [] Patient able to complete tx [] Patient limited by fatigue  [] Patient limited by pain  [] Patient limited by other medical complications  [] Other:     Prognosis: [] Good [] Fair  [] Poor    Patient Requires Follow-up: [x] Yes  [] No    Plan for next treatment session:    PLAN: See eval. PT 2x / week for 6 weeks. [] Continue per plan of care [] Alter current plan (see comments)  [x] Plan of care initiated [] Hold pending MD visit [] Discharge    Electronically signed by: Gumaro Thomas, PT PT, DPT    Note: If patient does not return for scheduled/ recommended follow up visits, this note will serve as a discharge from care along with most recent update on progress.

## 2022-12-08 ENCOUNTER — HOSPITAL ENCOUNTER (OUTPATIENT)
Dept: PHYSICAL THERAPY | Age: 52
Setting detail: THERAPIES SERIES
Discharge: HOME | End: 2022-12-08
Payer: COMMERCIAL

## 2022-12-08 PROCEDURE — 97140 MANUAL THERAPY 1/> REGIONS: CPT

## 2022-12-08 PROCEDURE — 97110 THERAPEUTIC EXERCISES: CPT

## 2022-12-08 NOTE — FLOWSHEET NOTE
[address] [facility] Physical Therapy  Phone: [phone]   Fax: [phone]    Physical Therapy Daily Treatment Note    Date:  2022     Patient Name:  Flores Mejia    :  1970  MRN: [id number]  Medical Diagnosis:  Plantar fascial fibromatosis [M72.2]  Achilles tendinitis, right leg [M76.61]  Treatment Diagnosis: Difficult walking, Limited dorsi right ankle Flexion , low activity tolerance in standing, soft tissue restriction at right soleus                                    Insurance/Certification information:  PT Insurance Information: UMR  Physician Information:  Jackie Numbers,*    Plan of care signed (Y/N): []  Yes [x]  No     Date of Patient follow up with Physician:      Progress Report: []  Yes  [x]  No     Date Range for reporting period:  Beginnin2022  Ending:     Progress report due (10 Rx/or 30 days whichever is less): visit #41 or       Recertification due (POC duration/ or 90 days whichever is less): visit # or  (date)     Visit # Insurance Allowable Auth required? Date Range    [place] []  Yes  [x]  No NA           Latex Allergy:  [x]NO      []YES  Preferred Language for Healthcare:   [x]English       []other:    Functional Scale:           Date assessed:  TO physical FS primary measure score = 47; risk adjusted = 52  2022    Pain level:  3-8/10     SUBJECTIVE:  7/10 pain this morning, 3/10 pain currently. HEP is going well. Stretching is going well at home.      OBJECTIVE: See eval      RESTRICTIONS/PRECAUTIONS:     Exercises/Interventions:     Therapeutic Exercises (40558) Resistance / level Sets/sec Reps Notes   Bike  Nustep  5\"     IB  3 way HR  2 x 30\"     15 ea    Soleus stretch  2 x 30\"     Towel scrunches  2 x 1' ea     Toe yoga:  Great toe  Digits 2-5    2  2   10  10                                Therapeutic Activities (61427)                                   Gait (67383) Neuromuscular Re-ed (50048)       Arch raise/ leah exercise                                          Manual Intervention ([id number])       HG # 8 PF B  X8 min each     Splay mobs  X2 min B     Great toe stretching Flexion/ext X2 min B                              Modalities:     Pt. Education:  12/08/2022  -patient educated on diagnosis, prognosis and expectations for rehab  -all patient questions were answered    Home Exercise Program:         Access Code: [email or web address]  URL: [email or web address]  Date: 11/28/2022  Prepared by: Anne Asher    Exercises  Long Sitting Calf Stretch with Strap - 1 x daily - 7 x weekly - 1 sets - 2 reps - 20s hold  Arch Lifting - 1 x daily - 7 x weekly - 1 sets - 10 reps  Seated [place] Pick-Up with Toes - 1 x daily - 7 x weekly - 1 sets - 10 reps                                Therapeutic Exercise and NMR EXR  [x] (52342) Provided verbal/tactile cueing for activities related to strengthening, flexibility, endurance, ROM for improvements in  [] LE / Lumbar: LE, proximal hip, and core control with self care, mobility, lifting, ambulation. [] UE / Cervical: cervical, postural, scapular, scapulothoracic and UE control with self care, reaching, carrying, lifting, house/yardwork, driving, computer work.  [] (33235) Provided verbal/tactile cueing for activities related to improving balance, coordination, kinesthetic sense, posture, motor skill, proprioception to assist with   [] LE / lumbar: LE, proximal hip, and core control in self care, mobility, lifting, ambulation and eccentric single leg control.    [] UE / cervical: cervical, scapular, scapulothoracic and UE control with self care, reaching, carrying, lifting, house/yardwork, driving, computer work.   [] (21700) Therapist is in constant attendance of 2 or more patients providing skilled therapy interventions, but not providing any significant amount of measurable one-on-one time to either patient, for improvements in  [] LE / lumbar: LE, proximal hip, and core control in self care, mobility, lifting, ambulation and eccentric single leg control. [] UE / cervical: cervical, scapular, scapulothoracic and UE control with self care, reaching, carrying, lifting, house/yardwork, driving, computer work. NMR and Therapeutic Activities:    [] (70402 or 47620) Provided verbal/tactile cueing for activities related to improving balance, coordination, kinesthetic sense, posture, motor skill, proprioception and motor activation to allow for proper function of   [] LE: / Lumbar core, proximal hip and LE with self care and ADLs  [] UE / Cervical: cervical, postural, scapular, scapulothoracic and UE control with self care, carrying, lifting, driving, computer work.   [] (75576) Gait Re-education- Provided training and instruction to the patient for proper LE, core and proximal hip recruitment and positioning and eccentric body weight control with ambulation re-education including up and down stairs     Home Management Training / Self Care:  [] (69065) Provided self-care/home management training related to activities of daily living and compensatory training, and/or use of adaptive equipment for improvement with: ADLs and compensatory training, meal preparation, safety procedures and instruction in use of adaptive equipment, including bathing, grooming, dressing, personal hygiene, basic household cleaning and chores.      Home Exercise Program:    [x] (93003) Reviewed/Progressed HEP activities related to strengthening, flexibility, endurance, ROM of   [] LE / Lumbar: core, proximal hip and LE for functional self-care, mobility, lifting and ambulation/stair navigation   [] UE / Cervical: cervical, postural, scapular, scapulothoracic and UE control with self care, reaching, carrying, lifting, house/yardwork, driving, computer work  [] (81521)Reviewed/Progressed HEP activities related to improving balance, coordination, kinesthetic sense, posture, motor skill, proprioception of   [] LE: core, proximal hip and LE for self care, mobility, lifting, and ambulation/stair navigation    [] UE / Cervical: cervical, postural,  scapular, scapulothoracic and UE control with self care, reaching, carrying, lifting, house/yardwork, driving, computer work    Manual Treatments:  PROM / STM / Oscillations-Mobs:  G-I, II, III, IV (PA's, Inf., Post.)  [] (99086) Provided manual therapy to mobilize LE, proximal hip and/or LS spine soft tissue/joints for the purpose of modulating pain, promoting relaxation,  increasing ROM, reducing/eliminating soft tissue swelling/inflammation/restriction, improving soft tissue extensibility and allowing for proper ROM for normal function with   [] LE / lumbar: self care, mobility, lifting and ambulation. [] UE / Cervical: self care, reaching, carrying, lifting, house/yardwork, driving, computer work. Modalities:  [] (38261) Vasopneumatic compression: Utilized vasopneumatic compression to decrease edema / swelling for the purpose of improving mobility and quad tone / recruitment which will allow for increased overall function including but not limited to self-care, transfers, ambulation, and ascending / descending stairs. Charges:  Timed Code Treatment Minutes: 60   Total Treatment Minutes: 60     [] EVAL - LOW (79516)   [] EVAL - MOD (64345)  [] EVAL - HIGH (78440)  [] RE-EVAL (93405)  [x] BT(04016) x   2    [] Ionto  [] NMR (78679) x       [] Vaso  [x] Manual (35744) x 2      [] Ultrasound  [] TA x        [] Mech Traction (67782)  [] Aquatic Therapy x     [] ES (un) (79347):   [] Home Management Training x  [] ES(attended) (30483)   [] Dry Needling 1-2 muscles (49139):  [] Dry Needling 3+ muscles (458234)  [] Group:      [] Other:     GOALS:    Patient stated goal: To improve walking   [] Progressing: [] Met: [] Not Met: [] Adjusted     Therapist goals for Patient:   Short Term Goals: To be achieved in: 2 weeks  1.  Independent in HEP and progression per patient tolerance, in order to prevent re-injury. [] Progressing: [] Met: [] Not Met: [] Adjusted  2. Patient will have a decrease in pain to facilitate improvement in movement, function, and ADLs as indicated by Functional Deficits. [] Progressing: [] Met: [] Not Met: [] Adjusted     Long Term Goals: To be achieved in: 6 weeks  Patient to improve FOTO score of at least  to assist with reaching prior level of function. [] Progressing: [] Met: [] Not Met: [] Adjusted  2. Patient will demonstrate increased right ankle DF AROM by 5deg to allow for proper joint functioning as indicated by patients Functional Deficits. [] Progressing: [] Met: [] Not Met: [] Adjusted  3. Patient will demonstrate an increase in Strength to at least  as well as good proximal hip strength and control to allow for proper functional mobility as indicated by patients Functional Deficits. [] Progressing: [] Met: [] Not Met: [] Adjusted  4. Patient will return to functional activities including ambulating on all surfaces without increased symptoms or restriction. [] Progressing: [] Met: [] Not Met: [] Adjusted  5. Patient to tolerate standing > 30 to carryout daily functional task. [] Progressing: [] Met: [] Not Met: [] Adjusted         Overall Progression Towards Functional goals/ Treatment Progress Update:  [] Patient is progressing as expected towards functional goals listed. [] Progression is slowed due to complexities/Impairments listed. [] Progression has been slowed due to co-morbidities.   [x] Plan just implemented, too soon to assess goals progression <30days   [] Goals require adjustment due to lack of progress  [] Patient is not progressing as expected and requires additional follow up with physician  [] Other    Persisting Functional Limitations/Impairments:  []Sleeping []Sitting               [x]Standing []Transfers        [x]Walking []Kneeling               []Stairs []Squatting / bending   []ADLs []Reaching  []Lifting  []Housework  []Driving []Job related tasks  []Sports/Recreation []Other:        ASSESSMENT: Pt reports significant improvement in symptoms post manual.  Progressed foot intrinsic strengthening which challenged pt. Pt would benefit from continued skilled physical therapy. Treatment/Activity Tolerance:  [x] Patient able to complete tx [] Patient limited by fatigue  [] Patient limited by pain  [] Patient limited by other medical complications  [] Other:     Prognosis: [x] Good [] Fair  [] Poor    Patient Requires Follow-up: [x] Yes  [] No    Plan for next treatment session:    PLAN: See eval. PT 2x / week for 6 weeks. [] Continue per plan of care [] Alter current plan (see comments)  [x] Plan of care initiated [] Hold pending MD visit [] Discharge    Electronically signed by: Lurdes Craven, PT, DPT, [email or web address]    Note: If patient does not return for scheduled/ recommended follow up visits, this note will serve as a discharge from care along with most recent update on progress.

## 2022-12-12 ENCOUNTER — HOSPITAL ENCOUNTER (OUTPATIENT)
Dept: PHYSICAL THERAPY | Age: 52
Setting detail: THERAPIES SERIES
Discharge: HOME | End: 2022-12-12
Payer: COMMERCIAL

## 2022-12-12 NOTE — FLOWSHEET NOTE
[address]     Physical Therapy  Cancellation/No-show Note  Patient Name:  Lattie Epley  :  1970   Date:  2022  Cancelled visits to date: 1  No-shows to date: 0    Patient status for today's appointment patient:  [x]  Cancelled 2022  []  Rescheduled appointment  []  No-show     Reason given by patient:  []  Patient ill  []  Conflicting appointment  []  No transportation    []  Conflict with work  []  No reason given  [x]  Other:    : Pt on a oral steroid for now and must remain in her boot per PCP. Comments:      Phone call information:   []  Phone call made today to patient at _ time at number provided:      []  Patient answered, conversation as follows:    []  Patient did not answer, message left as follows:  [x]  Phone call not made today  []  Phone call not needed - pt contacted us to cancel and provided reason for cancellation.      Electronically signed by:  Ester Desai PT

## 2022-12-15 ENCOUNTER — APPOINTMENT (OUTPATIENT)
Dept: PHYSICAL THERAPY | Age: 52
End: 2022-12-15
Payer: COMMERCIAL

## 2022-12-19 ENCOUNTER — APPOINTMENT (OUTPATIENT)
Dept: PHYSICAL THERAPY | Age: 52
End: 2022-12-19
Payer: COMMERCIAL

## 2022-12-22 ENCOUNTER — APPOINTMENT (OUTPATIENT)
Dept: PHYSICAL THERAPY | Age: 52
End: 2022-12-22
Payer: COMMERCIAL

## 2022-12-27 ENCOUNTER — HOSPITAL ENCOUNTER (OUTPATIENT)
Dept: PHYSICAL THERAPY | Age: 52
Setting detail: THERAPIES SERIES
Discharge: HOME | End: 2022-12-27
Payer: COMMERCIAL

## 2022-12-29 ENCOUNTER — APPOINTMENT (OUTPATIENT)
Dept: PHYSICAL THERAPY | Age: 52
End: 2022-12-29
Payer: COMMERCIAL

## 2023-06-06 ENCOUNTER — HOSPITAL ENCOUNTER (OUTPATIENT)
Dept: MAMMOGRAPHY | Age: 53
Discharge: HOME | End: 2023-06-06
Payer: COMMERCIAL

## 2023-06-06 VITALS — BODY MASS INDEX: 35.36 KG/M2 | WEIGHT: 220 LBS | HEIGHT: 66 IN

## 2023-06-06 DIAGNOSIS — Z12.31 VISIT FOR SCREENING MAMMOGRAM: ICD-10-CM

## 2023-06-06 PROCEDURE — 77063 BREAST TOMOSYNTHESIS BI: CPT

## 2024-06-18 ENCOUNTER — HOSPITAL ENCOUNTER (OUTPATIENT)
Dept: MAMMOGRAPHY | Age: 54
Discharge: HOME | End: 2024-06-18
Payer: COMMERCIAL

## 2024-06-18 VITALS — WEIGHT: 220 LBS | HEIGHT: 66 IN | BODY MASS INDEX: 35.36 KG/M2

## 2024-06-18 DIAGNOSIS — Z12.31 VISIT FOR SCREENING MAMMOGRAM: ICD-10-CM

## 2024-06-18 PROCEDURE — 77063 BREAST TOMOSYNTHESIS BI: CPT

## 2024-07-25 ENCOUNTER — HOSPITAL ENCOUNTER (OUTPATIENT)
Dept: PHYSICAL THERAPY | Age: 54
Setting detail: THERAPIES SERIES
End: 2024-07-25
Payer: COMMERCIAL

## 2024-07-30 ENCOUNTER — HOSPITAL ENCOUNTER (OUTPATIENT)
Dept: PHYSICAL THERAPY | Age: 54
Setting detail: THERAPIES SERIES
Discharge: HOME | End: 2024-07-30
Payer: COMMERCIAL

## 2024-07-30 DIAGNOSIS — M76.61 ACHILLES TENDINITIS OF RIGHT LOWER EXTREMITY: Primary | ICD-10-CM

## 2024-07-30 PROCEDURE — 97530 THERAPEUTIC ACTIVITIES: CPT

## 2024-07-30 PROCEDURE — 97161 PT EVAL LOW COMPLEX 20 MIN: CPT

## 2024-07-30 PROCEDURE — 97140 MANUAL THERAPY 1/> REGIONS: CPT

## 2024-07-30 NOTE — PLAN OF CARE
Aquatic Therex (17373)    Dry Needle 3+ muscle (47604)     Iontophoresis (06986)    VASO (31009)     Ultrasound (12550)    Group Therapy (55795)     Estim Attended (55873)    Canalith Repositioning (71208)     Other:    Other:    Total Timed Code Tx Minutes 30 2  1     Total Treatment Minutes 45        Charge Justification:  (75311) HOME EXERCISE PROGRAM - Reviewed/Progressed HEP activities related to strengthening, flexibility, endurance, ROM performed to prevent loss of range of motion, maintain or improve muscular strength or increase flexibility, following either an injury or surgery.  (82627) THERAPEUTIC ACTIVITY - use of dynamic activities to improve functional performance. (Ex include squatting, ascending/descending stairs, walking, bending, lifting, catching, throwing, pushing, pulling, jumping.)  Direct, one on one contact, billed in 15-minute increments.  (00957) MANUAL THERAPY -  Manual therapy techniques, 1 or more regions, each 15 minutes (Mobilization/manipulation, manual lymphatic drainage, manual traction) for the purpose of modulating pain, promoting relaxation,  increasing ROM, reducing/eliminating soft tissue swelling/inflammation/restriction, improving soft tissue extensibility and allowing for proper ROM for normal function with self care, mobility, lifting and ambulation    GOALS     Patient stated goal: \"to undo compensating issues, walk normal, increase strength to hike, walk normally\"  [] Progressing: [] Met: [] Not Met: [] Adjusted    Therapist goals for Patient:   Short Term Goals: To be achieved in: 2 weeks  1. Independent in HEP and progression per patient tolerance, in order to prevent re-injury.   [] Progressing: [] Met: [] Not Met: [] Adjusted  2. Patient will have a decrease in pain to <3/10 to facilitate improvement in movement, function, and ADLs as indicated by Functional Deficits.  [] Progressing: [] Met: [] Not Met: [] Adjusted    Long Term Goals: To be achieved in: 6 weeks  1.

## 2024-08-01 ENCOUNTER — HOSPITAL ENCOUNTER (OUTPATIENT)
Dept: PHYSICAL THERAPY | Age: 54
Setting detail: THERAPIES SERIES
Discharge: HOME | End: 2024-08-01
Payer: COMMERCIAL

## 2024-08-01 PROCEDURE — 20560 NDL INSJ W/O NJX 1 OR 2 MUSC: CPT

## 2024-08-01 PROCEDURE — 97140 MANUAL THERAPY 1/> REGIONS: CPT

## 2024-08-01 PROCEDURE — 97110 THERAPEUTIC EXERCISES: CPT

## 2024-08-01 PROCEDURE — 97530 THERAPEUTIC ACTIVITIES: CPT

## 2024-08-01 NOTE — PLAN OF CARE
[facility] - Outpatient Rehabilitation and Therapy [address], [place] [address] office: [phone] fax: [phone]         Physical Therapy: TREATMENT/PROGRESS NOTE   Patient: Fernanda Porter (53 y.o. female)   Examination Date: 2024   :  1970 MRN: [id number]   Visit #: 2   Insurance Allowable Auth Needed   60 []Yes    [x]No    Insurance: Payor: UMR / Plan: UMR / Product Type: *No Product type* /   Insurance ID: [id number] - (Commercial)  Secondary Insurance (if applicable):    Treatment Diagnosis:     ICD-10-CM    1. Achilles tendinitis of right lower extremity  M76.61          Medical Diagnosis:  Achilles tendinosis of right ankle [M67.873]   Referring Physician: Les Hughes MD  PCP: Roberto Madera MD     Plan of care signed (Y/N):     Date of Patient follow up with Physician:      Plan of Care Report: NO  POC update due: (10 visits /OR AUTH LIMITS, whichever is less)  2024                                             Medical History:  Comorbidities:  Hypertension  Osteoarthritis  Anxiety  Depression  COVID-19  Relevant Medical History: L bimalleolar fracture, B plantar fasciitis (R more affected than L), significant R lateral ankle sprain                                         Precautions/ Contra-indications:           Latex allergy:  NO  Pacemaker:    NO  Contraindications for Manipulation: None  Date of Surgery: n/a - procedure performed 6/10/24  Other:    Red Flags:  None    Suicide Screening:   The patient did not verbalize a primary behavioral concern, suicidal ideation, suicidal intent, or demonstrate suicidal behaviors.    Preferred Language for Healthcare:   [x] English       [] other:    SUBJECTIVE EXAMINATION     Patient stated complaint: Pt is excited to try the bike. She would to get back to biking on paved trails. She noticed improved walking pattern after last session. She had no pain with the HEP during performance, but some at the top of

## 2024-08-06 ENCOUNTER — HOSPITAL ENCOUNTER (OUTPATIENT)
Dept: PHYSICAL THERAPY | Age: 54
Setting detail: THERAPIES SERIES
Discharge: HOME | End: 2024-08-06
Payer: COMMERCIAL

## 2024-08-06 PROCEDURE — 97110 THERAPEUTIC EXERCISES: CPT

## 2024-08-06 PROCEDURE — 97140 MANUAL THERAPY 1/> REGIONS: CPT

## 2024-08-06 PROCEDURE — 20560 NDL INSJ W/O NJX 1 OR 2 MUSC: CPT

## 2024-08-06 NOTE — FLOWSHEET NOTE
[facility] - Outpatient Rehabilitation and Therapy [address], [place] [address] office: [phone] fax: [phone]         Physical Therapy: TREATMENT/PROGRESS NOTE   Patient: Fernanda Porter (53 y.o. female)   Examination Date: 2024   :  1970 MRN: [id number]   Visit #: 3   Insurance Allowable Auth Needed   60 []Yes    [x]No    Insurance: Payor: UMR / Plan: UMR / Product Type: *No Product type* /   Insurance ID: [id number] - (Commercial)  Secondary Insurance (if applicable):    Treatment Diagnosis:     ICD-10-CM    1. Achilles tendinitis of right lower extremity  M76.61          Medical Diagnosis:  Achilles tendinosis of right ankle [M67.873]   Referring Physician: Les Hughes MD  PCP: Roberto Madera MD     Plan of care signed (Y/N):     Date of Patient follow up with Physician:      Plan of Care Report: NO  POC update due: (10 visits /OR AUTH LIMITS, whichever is less)  2024                                             Medical History:  Comorbidities:  Hypertension  Osteoarthritis  Anxiety  Depression  COVID-19  Relevant Medical History: L bimalleolar fracture, B plantar fasciitis (R more affected than L), significant R lateral ankle sprain                                         Precautions/ Contra-indications:           Latex allergy:  NO  Pacemaker:    NO  Contraindications for Manipulation: None  Date of Surgery: n/a - procedure performed 6/10/24  Other:    Red Flags:  None    Suicide Screening:   The patient did not verbalize a primary behavioral concern, suicidal ideation, suicidal intent, or demonstrate suicidal behaviors.    Preferred Language for Healthcare:   [x] English       [] other:    SUBJECTIVE EXAMINATION     Patient stated complaint: Pt reports that dry needling helped a great deal with her symptoms. Went for her first outdoor bike ride on  - the ride went fine. Was sore yesterday - today is feeling better than yesterday but still a

## 2024-08-08 ENCOUNTER — HOSPITAL ENCOUNTER (OUTPATIENT)
Dept: PHYSICAL THERAPY | Age: 54
Setting detail: THERAPIES SERIES
Discharge: HOME | End: 2024-08-08
Payer: COMMERCIAL

## 2024-08-08 PROCEDURE — 97140 MANUAL THERAPY 1/> REGIONS: CPT

## 2024-08-08 NOTE — FLOWSHEET NOTE
[facility] - Outpatient Rehabilitation and Therapy [address], [place] [address] office: [phone] fax: [phone]         Physical Therapy: TREATMENT/PROGRESS NOTE   Patient: Fernanda Porter (53 y.o. female)   Examination Date: 2024   :  1970 MRN: [id number]   Visit #: 4   Insurance Allowable Auth Needed   60 []Yes    [x]No    Insurance: Payor: UMR / Plan: UMR / Product Type: *No Product type* /   Insurance ID: [id number] - (Commercial)  Secondary Insurance (if applicable):    Treatment Diagnosis:     ICD-10-CM    1. Achilles tendinitis of right lower extremity  M76.61          Medical Diagnosis:  Achilles tendinosis of right ankle [M67.873]   Referring Physician: Les Hughes MD  PCP: Roberto Madera MD     Plan of care signed (Y/N):     Date of Patient follow up with Physician:      Plan of Care Report: NO  POC update due: (10 visits /OR AUTH LIMITS, whichever is less)  2024                                             Medical History:  Comorbidities:  Hypertension  Osteoarthritis  Anxiety  Depression  COVID-19  Relevant Medical History: L bimalleolar fracture, B plantar fasciitis (R more affected than L), significant R lateral ankle sprain                                         Precautions/ Contra-indications:           Latex allergy:  NO  Pacemaker:    NO  Contraindications for Manipulation: None  Date of Surgery: n/a - procedure performed 6/10/24  Other:    Red Flags:  None    Suicide Screening:   The patient did not verbalize a primary behavioral concern, suicidal ideation, suicidal intent, or demonstrate suicidal behaviors.    Preferred Language for Healthcare:   [x] English       [] other:    SUBJECTIVE EXAMINATION     Patient stated complaint: Pt reports that she is quite sore today with no known cause.       Test used Initial score  2024   Pain Summary VAS 2-3 6   Functional questionnaire LEFS 30 / 62.5%    Other:

## 2024-08-12 ENCOUNTER — HOSPITAL ENCOUNTER (OUTPATIENT)
Dept: PHYSICAL THERAPY | Age: 54
Setting detail: THERAPIES SERIES
Discharge: HOME | End: 2024-08-12
Payer: COMMERCIAL

## 2024-08-12 PROCEDURE — 97140 MANUAL THERAPY 1/> REGIONS: CPT

## 2024-08-12 PROCEDURE — 97110 THERAPEUTIC EXERCISES: CPT

## 2024-08-12 PROCEDURE — 97530 THERAPEUTIC ACTIVITIES: CPT

## 2024-08-12 NOTE — FLOWSHEET NOTE
proper joint functioning to enable patient to ambulate on level ground and stairs without restriction.   [] Progressing: [] Met: [] Not Met: [] Adjusted  3. Patient will return to recreational biking and walking with minimal to no increase in symptoms.  [] Progressing: [] Met: [] Not Met: [] Adjusted       Overall Progression Towards Functional goals/ Treatment Progress Update:  [] Patient is progressing as expected towards functional goals listed.    [] Progression is slowed due to complexities/Impairments listed.  [] Progression has been slowed due to co-morbidities.  [x] Plan just implemented, too soon (<30days) to assess goals progression   [] Goals require adjustment due to lack of progress  [] Patient is not progressing as expected and requires additional follow up with physician  [] Other:     TREATMENT PLAN     Frequency/Duration: 2x/week for 4-6 weeks for the following treatment interventions:    Interventions:  Therapeutic Exercise (82326) including: strength training, ROM, and functional mobility  Therapeutic Activities (60589) including: functional mobility training and education.  Neuromuscular Re-education (90375) activation and proprioception, including postural re-education.    Gait Training (10169) for normalization of ambulation patterns and AD training.   Manual Therapy (12726) as indicated to include: Passive Range of Motion, Gr I-IV mobilizations, Soft Tissue Mobilization, Dry Needling/IASTM, and Trigger Point Release  Modalities as needed that may include: Cryotherapy, Electrical Stimulation, and Vasoneumatic Compression  Patient education on activity modification and progression of HEP    Plan: POC initiated as per evaluation    Electronically Signed by Dorina Reed PT  Date: 08/12/2024     Note: Portions of this note have been templated and/or copied from initial evaluation, reassessments and prior notes for documentation efficiency.    Note: If patient does not return for

## 2024-08-14 ENCOUNTER — APPOINTMENT (OUTPATIENT)
Dept: PHYSICAL THERAPY | Age: 54
End: 2024-08-14
Payer: COMMERCIAL

## 2024-08-15 ENCOUNTER — HOSPITAL ENCOUNTER (OUTPATIENT)
Dept: PHYSICAL THERAPY | Age: 54
Setting detail: THERAPIES SERIES
Discharge: HOME | End: 2024-08-15
Payer: COMMERCIAL

## 2024-08-15 PROCEDURE — 97140 MANUAL THERAPY 1/> REGIONS: CPT

## 2024-08-15 PROCEDURE — 97110 THERAPEUTIC EXERCISES: CPT

## 2024-08-15 NOTE — FLOWSHEET NOTE
without restriction.   [] Progressing: [] Met: [] Not Met: [] Adjusted  3. Patient will return to recreational biking and walking with minimal to no increase in symptoms.  [] Progressing: [] Met: [] Not Met: [] Adjusted       Overall Progression Towards Functional goals/ Treatment Progress Update:  [] Patient is progressing as expected towards functional goals listed.    [] Progression is slowed due to complexities/Impairments listed.  [] Progression has been slowed due to co-morbidities.  [x] Plan just implemented, too soon (<30days) to assess goals progression   [] Goals require adjustment due to lack of progress  [] Patient is not progressing as expected and requires additional follow up with physician  [] Other:     TREATMENT PLAN     Frequency/Duration: 2x/week for 4-6 weeks for the following treatment interventions:    Interventions:  Therapeutic Exercise (09402) including: strength training, ROM, and functional mobility  Therapeutic Activities (16167) including: functional mobility training and education.  Neuromuscular Re-education (02861) activation and proprioception, including postural re-education.    Gait Training (89800) for normalization of ambulation patterns and AD training.   Manual Therapy (73707) as indicated to include: Passive Range of Motion, Gr I-IV mobilizations, Soft Tissue Mobilization, Dry Needling/IASTM, and Trigger Point Release  Modalities as needed that may include: Cryotherapy, Electrical Stimulation, and Vasoneumatic Compression  Patient education on activity modification and progression of HEP    Plan: POC initiated as per evaluation    Electronically Signed by Dorina Reed, SUDHA  Date: 08/15/2024     Note: Portions of this note have been templated and/or copied from initial evaluation, reassessments and prior notes for documentation efficiency.    Note: If patient does not return for scheduled/recommended follow up visits, this note will serve as a discharge from care along with the

## 2024-08-19 ENCOUNTER — HOSPITAL ENCOUNTER (OUTPATIENT)
Dept: PHYSICAL THERAPY | Age: 54
Setting detail: THERAPIES SERIES
Discharge: HOME | End: 2024-08-19
Payer: COMMERCIAL

## 2024-08-19 PROCEDURE — 97110 THERAPEUTIC EXERCISES: CPT

## 2024-08-19 PROCEDURE — 97140 MANUAL THERAPY 1/> REGIONS: CPT

## 2024-08-19 NOTE — FLOWSHEET NOTE
(66489)    Dry Needle 1-2 muscle (71336)     Aquatic Therex (74702)    Dry Needle 3+ muscle (91102)     Iontophoresis (55256)    VASO (53232)     Ultrasound (63997)    Group Therapy (88376)     Estim Attended (65623)    Canalith Repositioning (35535)     Other:    Other:    Total Timed Code Tx Minutes 40 3       Total Treatment Minutes 40        Charge Justification:  (68971) THERAPEUTIC EXERCISE - Provided verbal/tactile cueing for activities related to strengthening, flexibility, endurance, ROM performed to prevent loss of range of motion, maintain or improve muscular strength or increase flexibility, following either an injury or surgery.   (49773) MANUAL THERAPY -  Manual therapy techniques, 1 or more regions, each 15 minutes (Mobilization/manipulation, manual lymphatic drainage, manual traction) for the purpose of modulating pain, promoting relaxation,  increasing ROM, reducing/eliminating soft tissue swelling/inflammation/restriction, improving soft tissue extensibility and allowing for proper ROM for normal function with self care, mobility, lifting and ambulation    GOALS     Patient stated goal: \"to undo compensating issues, walk normal, increase strength to hike, walk normally\"  [] Progressing: [] Met: [] Not Met: [] Adjusted    Therapist goals for Patient:   Short Term Goals: To be achieved in: 2 weeks  1. Independent in HEP and progression per patient tolerance, in order to prevent re-injury.   [] Progressing: [] Met: [] Not Met: [] Adjusted  2. Patient will have a decrease in pain to <3/10 to facilitate improvement in movement, function, and ADLs as indicated by Functional Deficits.  [] Progressing: [] Met: [] Not Met: [] Adjusted    Long Term Goals: To be achieved in: 6 weeks  1. Disability index score of 10% or less for the LEFS to assist with reaching prior level of function with activities such as prolonged walking with normal gait pattern, gardening, biking, and ADLs.  [] Progressing: [] Met: []

## 2024-08-23 ENCOUNTER — HOSPITAL ENCOUNTER (OUTPATIENT)
Dept: PHYSICAL THERAPY | Age: 54
Setting detail: THERAPIES SERIES
Discharge: HOME | End: 2024-08-23
Payer: COMMERCIAL

## 2024-08-23 PROCEDURE — 97140 MANUAL THERAPY 1/> REGIONS: CPT

## 2024-08-23 PROCEDURE — 97110 THERAPEUTIC EXERCISES: CPT

## 2024-08-23 PROCEDURE — 20560 NDL INSJ W/O NJX 1 OR 2 MUSC: CPT

## 2024-08-23 NOTE — FLOWSHEET NOTE
[facility] - Outpatient Rehabilitation and Therapy [address], [place] [address] office: [phone] fax: [phone]         Physical Therapy: TREATMENT/PROGRESS NOTE   Patient: Fernanda Porter (53 y.o. female)   Examination Date: 2024   :  1970 MRN: [id number]   Visit #: 8   Insurance Allowable Auth Needed   60 []Yes    [x]No    Insurance: Payor: UMR / Plan: UMR / Product Type: *No Product type* /   Insurance ID: [id number] - (Commercial)  Secondary Insurance (if applicable):    Treatment Diagnosis:     ICD-10-CM    1. Achilles tendinitis of right lower extremity  M76.61          Medical Diagnosis:  Achilles tendinosis of right ankle [M67.873]   Referring Physician: Les Hughes MD  PCP: Roberto Madera MD     Plan of care signed (Y/N):     Date of Patient follow up with Physician:      Plan of Care Report: NO  POC update due: (10 visits /OR AUTH LIMITS, whichever is less)  2024                                             Medical History:  Comorbidities:  Hypertension  Osteoarthritis  Anxiety  Depression  COVID-19  Relevant Medical History: L bimalleolar fracture, B plantar fasciitis (R more affected than L), significant R lateral ankle sprain                                         Precautions/ Contra-indications:           Latex allergy:  NO  Pacemaker:    NO  Contraindications for Manipulation: None  Date of Surgery: n/a - procedure performed 6/10/24  Other:    Red Flags:  None    Suicide Screening:   The patient did not verbalize a primary behavioral concern, suicidal ideation, suicidal intent, or demonstrate suicidal behaviors.    Preferred Language for Healthcare:   [x] English       [] other:    SUBJECTIVE EXAMINATION     Patient stated complaint: Pt reports she got the [employer] but has been having pain and also numbness of region of the R great toe. She is liking the increased heel to toe drop. She plans to exchange.  Today she is having some

## 2024-08-26 ENCOUNTER — HOSPITAL ENCOUNTER (OUTPATIENT)
Dept: PHYSICAL THERAPY | Age: 54
Setting detail: THERAPIES SERIES
Discharge: HOME | End: 2024-08-26
Payer: COMMERCIAL

## 2024-08-26 PROCEDURE — 97112 NEUROMUSCULAR REEDUCATION: CPT

## 2024-08-26 PROCEDURE — 97140 MANUAL THERAPY 1/> REGIONS: CPT

## 2024-08-26 PROCEDURE — 97110 THERAPEUTIC EXERCISES: CPT

## 2024-08-26 NOTE — FLOWSHEET NOTE
[facility] - Outpatient Rehabilitation and Therapy [address], [place] [address] office: [phone] fax: [phone]         Physical Therapy: TREATMENT/PROGRESS NOTE   Patient: Fernanda Porter (53 y.o. female)   Examination Date: 2024   :  1970 MRN: [id number]   Visit #: 9   Insurance Allowable Auth Needed   60 []Yes    [x]No    Insurance: Payor: UMR / Plan: UMR / Product Type: *No Product type* /   Insurance ID: [id number] - (Commercial)  Secondary Insurance (if applicable):    Treatment Diagnosis:     ICD-10-CM    1. Achilles tendinitis of right lower extremity  M76.61          Medical Diagnosis:  Achilles tendinosis of right ankle [M67.873]   Referring Physician: Les Huhges MD  PCP: Roberto Madera MD     Plan of care signed (Y/N):     Date of Patient follow up with Physician:      Plan of Care Report: NO  POC update due: (10 visits /OR AUTH LIMITS, whichever is less)  2024                                             Medical History:  Comorbidities:  Hypertension  Osteoarthritis  Anxiety  Depression  COVID-19  Relevant Medical History: L bimalleolar fracture, B plantar fasciitis (R more affected than L), significant R lateral ankle sprain                                         Precautions/ Contra-indications:           Latex allergy:  NO  Pacemaker:    NO  Contraindications for Manipulation: None  Date of Surgery: n/a - procedure performed 6/10/24  Other:    Red Flags:  None    Suicide Screening:   The patient did not verbalize a primary behavioral concern, suicidal ideation, suicidal intent, or demonstrate suicidal behaviors.    Preferred Language for Healthcare:   [x] English       [] other:    SUBJECTIVE EXAMINATION     Patient stated complaint: Pt reports that she was able to hike without much trouble. Went on a 14 mile bike ride on Friday and didn't have much soreness. Felt fine after needling.        Test used Initial score  2024   Pain  normally\"  [] Progressing: [] Met: [] Not Met: [] Adjusted    Therapist goals for Patient:   Short Term Goals: To be achieved in: 2 weeks  1. Independent in HEP and progression per patient tolerance, in order to prevent re-injury.   [] Progressing: [] Met: [] Not Met: [] Adjusted  2. Patient will have a decrease in pain to <3/10 to facilitate improvement in movement, function, and ADLs as indicated by Functional Deficits.  [] Progressing: [] Met: [] Not Met: [] Adjusted    Long Term Goals: To be achieved in: 6 weeks  1. Disability index score of 10% or less for the LEFS to assist with reaching prior level of function with activities such as prolonged walking with normal gait pattern, gardening, biking, and ADLs.  [] Progressing: [] Met: [] Not Met: [] Adjusted  2. Patient will demonstrate increased AROM of R ankle DF and PF to at least 15, 40 respectively without pain to allow for proper joint functioning to enable patient to ambulate on level ground and stairs without restriction.   [] Progressing: [] Met: [] Not Met: [] Adjusted  3. Patient will return to recreational biking and walking with minimal to no increase in symptoms.  [] Progressing: [] Met: [] Not Met: [] Adjusted       Overall Progression Towards Functional goals/ Treatment Progress Update:  [] Patient is progressing as expected towards functional goals listed.    [] Progression is slowed due to complexities/Impairments listed.  [] Progression has been slowed due to co-morbidities.  [x] Plan just implemented, too soon (<30days) to assess goals progression   [] Goals require adjustment due to lack of progress  [] Patient is not progressing as expected and requires additional follow up with physician  [] Other:     TREATMENT PLAN     Frequency/Duration: 2x/week for 4-6 weeks for the following treatment interventions:    Interventions:  Therapeutic Exercise (88620) including: strength training, ROM, and functional mobility  Therapeutic Activities (73277)

## 2024-08-30 ENCOUNTER — HOSPITAL ENCOUNTER (OUTPATIENT)
Dept: PHYSICAL THERAPY | Age: 54
Setting detail: THERAPIES SERIES
Discharge: HOME | End: 2024-08-30
Payer: COMMERCIAL

## 2024-08-30 NOTE — PLAN OF CARE
[facility] - Outpatient Rehabilitation and Therapy [address], [place] [address] office: [phone] fax: [phone]     Physical Therapy Re-Certification Plan of Care    Dear Ellen, Les Melo MD  ,    We had the pleasure of treating the following patient for physical therapy services at [facility] Outpatient Physical Therapy. A summary of our findings can be found in the updated assessment below.  This includes our plan of care.  If you have any questions or concerns regarding these findings, please do not hesitate to contact me at the office phone number checked above.  Thank you for the referral.     Physician Signature:________________________________Date:__________________  By signing above (or electronic signature), therapist's plan is approved by physician      Functional Outcome: DEMARCO Porter 1970 continues to present with functional deficits in strength symmetry and endurance of strength  limiting ability with walking on even ground, walking on uneven ground, managing community ambulation, and heavy home activity .  During therapy this date, patient required progression of exercises and program for exercise progression. Patient will continue to benefit from ongoing evaluation and advanced clinical decision from a Physical Therapist to improve pain control, ROM, muscle strength, neuromuscular control, balance and proprioception, and functional mobility to safely return to OF without symptoms or restrictions.    Overall Response to Treatment:  Patient is responding well to treatment and improvement is noted with regards to goals    Total Visits: 10     Recommendation:    [] Continue PT 1x / wk for 4 weeks.   [] Hold PT, pending MD visit   [] Discharge to [facility]. Follow up with PT or MD PRN.        Physical Therapy: TREATMENT/PROGRESS NOTE   Patient: Fernanda Porter (54 y.o. female)   Examination Date: 2024   :  1970 MRN: [id number]   Visit #: 10  on the R / decreased stance time on L   Assistive Device Used: no AD    Balance:  [x] WNL      [] NT       [] Dysfunction noted  Comment:     Falls Risk Assessment (30 days):   Tandem stance: LLE lead 2 mistakes 30 seconds; RLE lead 3 mistakes 30 seconds          Exercises/Interventions     Therapeutic Ex (90727)  resistance Sets/time Reps Notes/Cues/Progressions   Bike  5 min  Recumbent, seat 4   DL heel raise iso   DL heel raise knees bent iso   Eccentric heel raise   3x30s  3x30s  3x12  + 2 sets    +1 set    DL heel raise  DL heel raise knees bent   1 20 ea Warm up    DL HR iso on leg press  70lb 3x30s     Leg press  90lb, 90lb  3 12 + weight    SL heel raise  50lb  3 10    Unsupported mini squats   3 12 Resume future visit   DL heel raises on leg press  90lb  3 12                         Manual Intervention (89443)  TIME     STM R gastroc/soleus, achilles   STM R plantar aspect of foot   8 min     PROM DF + stretch  PROM Great toe extension + stretch  5 min  3 min     TC and ST jt distraction and A-P glide   Calcaneal rock  4 min     1 min     Tgun R calf   10 min            NMR re-education (69750) resistance Sets/time Reps CUES NEEDED   Tandem stance balance   SL stance balance   3x30s all     Toe yoga:  -GT lifts  -little toe lifts  -toe splaying  -arch doming   20 ea                          Therapeutic Activity (85184)  Sets/time     Provided w/ HEP (see below) and reviewed all; pt edu re: higher heel toe drop shoes for symptom management for time being, POC, eval findings, DN, biking vs walking for exercise       POC update / discussion re: progress  25'                            Spoke with Dr. Hughes  regarding the use of Dry Needling         8/6: Dry needling manual therapy: consisted on the placement of 3 needles in the following muscles:  lateral gastroc, peroneals.  A 40-50 mm needle was inserted, piston, rotated, and coned to produce intramuscular mobilization.  These techniques were used to

## 2024-09-03 ENCOUNTER — HOSPITAL ENCOUNTER (OUTPATIENT)
Dept: PHYSICAL THERAPY | Age: 54
Setting detail: THERAPIES SERIES
Discharge: HOME | End: 2024-09-03
Payer: COMMERCIAL

## 2024-09-03 PROCEDURE — 97112 NEUROMUSCULAR REEDUCATION: CPT

## 2024-09-03 PROCEDURE — 97110 THERAPEUTIC EXERCISES: CPT

## 2024-09-03 NOTE — FLOWSHEET NOTE
slowed due to complexities/Impairments listed.  [] Progression has been slowed due to co-morbidities.  [] Plan just implemented, too soon (<30days) to assess goals progression   [] Goals require adjustment due to lack of progress  [] Patient is not progressing as expected and requires additional follow up with physician  [] Other:     TREATMENT PLAN     Frequency/Duration: 1x/week for 3-5 weeks for the following treatment interventions:    Interventions:  Therapeutic Exercise (90078) including: strength training, ROM, and functional mobility  Therapeutic Activities (59055) including: functional mobility training and education.  Neuromuscular Re-education (74816) activation and proprioception, including postural re-education.    Gait Training (97747) for normalization of ambulation patterns and AD training.   Manual Therapy (02628) as indicated to include: Passive Range of Motion, Gr I-IV mobilizations, Soft Tissue Mobilization, Dry Needling/IASTM, and Trigger Point Release  Modalities as needed that may include: Cryotherapy, Electrical Stimulation, and Vasoneumatic Compression  Patient education on activity modification and progression of HEP    Plan: POC initiated as per evaluation    Electronically Signed by Dorina Reed PT , DPT Date: 09/03/2024     Note: Portions of this note have been templated and/or copied from initial evaluation, reassessments and prior notes for documentation efficiency.    Note: If patient does not return for scheduled/recommended follow up visits, this note will serve as a discharge from care along with the most recent update on progress.    Ortho Evaluation

## 2024-09-05 ENCOUNTER — HOSPITAL ENCOUNTER (OUTPATIENT)
Dept: PHYSICAL THERAPY | Age: 54
Setting detail: THERAPIES SERIES
Discharge: HOME | End: 2024-09-05
Payer: COMMERCIAL

## 2024-09-05 PROCEDURE — 97110 THERAPEUTIC EXERCISES: CPT

## 2024-09-05 PROCEDURE — 97140 MANUAL THERAPY 1/> REGIONS: CPT

## 2024-09-05 NOTE — FLOWSHEET NOTE
[facility] - Outpatient Rehabilitation and Therapy [address], [place] [address] office: [phone] fax: [phone]         Physical Therapy: TREATMENT/PROGRESS NOTE   Patient: Fernanda Porter (54 y.o. female)   Examination Date: 2024   :  1970 MRN: [id number]   Visit #: 12   Insurance Allowable Auth Needed   60 []Yes    [x]No    Insurance: Payor: UMR / Plan: UMR / Product Type: *No Product type* /   Insurance ID: [id number] - (Commercial)  Secondary Insurance (if applicable):    Treatment Diagnosis:     ICD-10-CM    1. Achilles tendinitis of right lower extremity  M76.61          Medical Diagnosis:  Achilles tendinosis of right ankle [M67.873]   Referring Physician: Les Hughes MD  PCP: Roberto Madera MD     Plan of care signed (Y/N):     Date of Patient follow up with Physician:      Plan of Care Report: NO  POC update due: (10 visits /OR AUTH LIMITS, whichever is less)  2024                                             Medical History:  Comorbidities:  Hypertension  Osteoarthritis  Anxiety  Depression  COVID-19  Relevant Medical History: L bimalleolar fracture, B plantar fasciitis (R more affected than L), significant R lateral ankle sprain                                         Precautions/ Contra-indications:           Latex allergy:  NO  Pacemaker:    NO  Contraindications for Manipulation: None  Date of Surgery: n/a - procedure performed 6/10/24  Other:    Red Flags:  None    Suicide Screening:   The patient did not verbalize a primary behavioral concern, suicidal ideation, suicidal intent, or demonstrate suicidal behaviors.    Preferred Language for Healthcare:   [x] English       [] other:    SUBJECTIVE EXAMINATION     Patient stated complaint: Pt reports that she did a bike ride Tuesday night. Wednesday felt fine in the morning, but then Wednesday wore clogs to work and this seemed to flare her achilles up a great deal. Today, she is quite sore.

## 2024-09-13 ENCOUNTER — HOSPITAL ENCOUNTER (OUTPATIENT)
Dept: PHYSICAL THERAPY | Age: 54
Setting detail: THERAPIES SERIES
Discharge: HOME | End: 2024-09-13
Payer: COMMERCIAL

## 2024-09-13 PROCEDURE — 97016 VASOPNEUMATIC DEVICE THERAPY: CPT

## 2024-09-13 PROCEDURE — 97140 MANUAL THERAPY 1/> REGIONS: CPT

## 2024-09-17 ENCOUNTER — HOSPITAL ENCOUNTER (OUTPATIENT)
Dept: PHYSICAL THERAPY | Age: 54
Setting detail: THERAPIES SERIES
Discharge: HOME | End: 2024-09-17
Payer: COMMERCIAL

## 2024-09-17 PROCEDURE — L3020 FOOT LONGITUD/METATARSAL SUP: HCPCS

## 2024-09-23 ENCOUNTER — HOSPITAL ENCOUNTER (OUTPATIENT)
Dept: PHYSICAL THERAPY | Age: 54
Setting detail: THERAPIES SERIES
Discharge: HOME | End: 2024-09-23
Payer: COMMERCIAL

## 2024-09-23 PROCEDURE — 97140 MANUAL THERAPY 1/> REGIONS: CPT

## 2024-09-23 PROCEDURE — 97110 THERAPEUTIC EXERCISES: CPT

## 2024-10-01 ENCOUNTER — HOSPITAL ENCOUNTER (OUTPATIENT)
Dept: PHYSICAL THERAPY | Age: 54
Setting detail: THERAPIES SERIES
Discharge: HOME | End: 2024-10-01
Payer: COMMERCIAL

## 2024-10-01 PROCEDURE — 97110 THERAPEUTIC EXERCISES: CPT

## 2024-10-01 PROCEDURE — 97140 MANUAL THERAPY 1/> REGIONS: CPT

## 2024-10-01 NOTE — PLAN OF CARE
prescription Foot Long/Met Sup )    GOALS     Patient stated goal: \"to undo compensating issues, walk normal, increase strength to hike, walk normally\"  [x] Progressing: [] Met: [] Not Met: [] Adjusted    Therapist goals for Patient:   Short Term Goals: To be achieved in: 2 weeks  1. Independent in HEP and progression per patient tolerance, in order to prevent re-injury.   [] Progressing: [x] Met: [] Not Met: [] Adjusted  2. Patient will have a decrease in pain to <3/10 to facilitate improvement in movement, function, and ADLs as indicated by Functional Deficits.  [] Progressing: [x] Met: [] Not Met: [] Adjusted      Long Term Goals: To be achieved in: 6 weeks  1. Disability index score of 10% or less for the LEFS to assist with reaching prior level of function with activities such as prolonged walking with normal gait pattern, gardening, biking, and ADLs.  [x] Progressing: [] Met: [] Not Met: [] Adjusted  2. Patient will demonstrate increased AROM of R ankle DF and PF to at least 15, 40 respectively without pain to allow for proper joint functioning to enable patient to ambulate on level ground and stairs without restriction.   [] Progressing: [x] Met: [] Not Met: [] Adjusted  3. Patient will return to recreational biking and walking with minimal to no increase in symptoms.  [x] Progressing: [] Met: [] Not Met: [] Adjusted    4.  Patient to demonstrate independence in wear and care for custom orthotic device. (Updated 9/17/24)  [] Progressing: [] Met: [] Not Met: [] Adjusted       Overall Progression Towards Functional goals/ Treatment Progress Update:  [x] Patient is progressing as expected towards functional goals listed.    [] Progression is slowed due to complexities/Impairments listed.  [] Progression has been slowed due to co-morbidities.  [] Plan just implemented, too soon (<30days) to assess goals progression   [] Goals require adjustment due to lack of progress  [] Patient is not progressing as

## 2024-10-07 ENCOUNTER — HOSPITAL ENCOUNTER (OUTPATIENT)
Dept: PHYSICAL THERAPY | Age: 54
Setting detail: THERAPIES SERIES
Discharge: HOME | End: 2024-10-07
Payer: COMMERCIAL

## 2024-10-07 PROCEDURE — 97110 THERAPEUTIC EXERCISES: CPT

## 2024-10-07 PROCEDURE — 97140 MANUAL THERAPY 1/> REGIONS: CPT

## 2024-10-07 NOTE — FLOWSHEET NOTE
[facility] - Outpatient Rehabilitation and Therapy [address], [place] [address] office: [phone] fax: [phone]        Physical Therapy: TREATMENT/PROGRESS NOTE   Patient: Fernanda Porter (54 y.o. female)   Examination Date: 10/07/2024   :  1970 MRN: [id number]   Visit #: 17   Insurance Allowable Auth Needed   60 []Yes    [x]No    Insurance: Payor: UMR / Plan: UMR / Product Type: *No Product type* /   Insurance ID: [id number] - (Commercial)  Secondary Insurance (if applicable):    Treatment Diagnosis:     ICD-10-CM    1. Achilles tendinitis of right lower extremity  M76.61          Medical Diagnosis:  Achilles tendinosis of right ankle [M67.873]   Referring Physician: Les Hughes MD  PCP: Roberto Madera MD     Plan of care signed (Y/N):     Date of Patient follow up with Physician:      Plan of Care Report: NO  POC update due: (10 visits /OR AUTH LIMITS, whichever is less)  10/29/2024                                             Medical History:  Comorbidities:  Hypertension  Osteoarthritis  Anxiety  Depression  COVID-19  Relevant Medical History: L bimalleolar fracture, B plantar fasciitis (R more affected than L), significant R lateral ankle sprain, 1999 laminectomy resulting in numbness top of R foot                                         Precautions/ Contra-indications:           Latex allergy:  NO  Pacemaker:    NO  Contraindications for Manipulation: None  Date of Surgery: n/a - procedure performed 6/10/24  Other:    Red Flags:  None    Suicide Screening:   The patient did not verbalize a primary behavioral concern, suicidal ideation, suicidal intent, or demonstrate suicidal behaviors.    Preferred Language for Healthcare:   [x] English       [] other:    SUBJECTIVE EXAMINATION     Patient stated complaint: Pt reports she had a biking incident over the weekend which increased her symptoms but feels her symptoms are improving each day.       Test used Initial

## 2024-10-15 ENCOUNTER — HOSPITAL ENCOUNTER (OUTPATIENT)
Dept: PHYSICAL THERAPY | Age: 54
Setting detail: THERAPIES SERIES
Discharge: HOME | End: 2024-10-15
Payer: COMMERCIAL

## 2024-10-15 PROCEDURE — 97110 THERAPEUTIC EXERCISES: CPT

## 2024-10-15 PROCEDURE — 97530 THERAPEUTIC ACTIVITIES: CPT

## 2024-10-15 PROCEDURE — 97140 MANUAL THERAPY 1/> REGIONS: CPT

## 2024-10-15 NOTE — FLOWSHEET NOTE
[facility] - Outpatient Rehabilitation and Therapy [address], [place] [address] office: [phone] fax: [phone]        Physical Therapy: TREATMENT/PROGRESS NOTE   Patient: Fernanda Porter (54 y.o. female)   Examination Date: 10/15/2024   :  1970 MRN: [id number]   Visit #: 18   Insurance Allowable Auth Needed   60 []Yes    [x]No    Insurance: Payor: UMR / Plan: UMR / Product Type: *No Product type* /   Insurance ID: [id number] - (Commercial)  Secondary Insurance (if applicable):    Treatment Diagnosis:     ICD-10-CM    1. Achilles tendinitis of right lower extremity  M76.61          Medical Diagnosis:  Achilles tendinosis of right ankle [M67.873]   Referring Physician: Les Hughes MD  PCP: Roberto Madera MD     Plan of care signed (Y/N):     Date of Patient follow up with Physician:      Plan of Care Report: NO  POC update due: (10 visits /OR AUTH LIMITS, whichever is less)  10/29/2024                                             Medical History:  Comorbidities:  Hypertension  Osteoarthritis  Anxiety  Depression  COVID-19  Relevant Medical History: L bimalleolar fracture, B plantar fasciitis (R more affected than L), significant R lateral ankle sprain, 1999 laminectomy resulting in numbness top of R foot                                         Precautions/ Contra-indications:           Latex allergy:  NO  Pacemaker:    NO  Contraindications for Manipulation: None  Date of Surgery: n/a - procedure performed 6/10/24  Other:    Red Flags:  None    Suicide Screening:   The patient did not verbalize a primary behavioral concern, suicidal ideation, suicidal intent, or demonstrate suicidal behaviors.    Preferred Language for Healthcare:   [x] English       [] other:    SUBJECTIVE EXAMINATION     Patient stated complaint: pt report she was able to bike over the weekend without much trouble. Left knee is bugging her now. Achilles has been moderately painful        Test used

## 2024-10-22 ENCOUNTER — HOSPITAL ENCOUNTER (OUTPATIENT)
Dept: PHYSICAL THERAPY | Age: 54
Setting detail: THERAPIES SERIES
Discharge: HOME | End: 2024-10-22
Payer: COMMERCIAL

## 2024-10-22 PROCEDURE — 97530 THERAPEUTIC ACTIVITIES: CPT

## 2024-10-22 PROCEDURE — 97110 THERAPEUTIC EXERCISES: CPT

## 2024-10-22 PROCEDURE — 97140 MANUAL THERAPY 1/> REGIONS: CPT

## 2024-10-22 NOTE — FLOWSHEET NOTE
[facility] - Outpatient Rehabilitation and Therapy [address], [place] [address] office: [phone] fax: [phone]        Physical Therapy: TREATMENT/PROGRESS NOTE   Patient: Fernanda Porter (54 y.o. female)   Examination Date: 10/22/2024   :  1970 MRN: [id number]   Visit #: 19   Insurance Allowable Auth Needed   60 []Yes    [x]No    Insurance: Payor: UMR / Plan: UMR / Product Type: *No Product type* /   Insurance ID: [id number] - (Commercial)  Secondary Insurance (if applicable):    Treatment Diagnosis:     ICD-10-CM    1. Achilles tendinitis of right lower extremity  M76.61          Medical Diagnosis:  Achilles tendinosis of right ankle [M67.873]   Referring Physician: Les Hughes MD  PCP: Roberto Mdaera MD     Plan of care signed (Y/N):     Date of Patient follow up with Physician:      Plan of Care Report: NO  POC update due: (10 visits /OR AUTH LIMITS, whichever is less)  10/29/2024                                             Medical History:  Comorbidities:  Hypertension  Osteoarthritis  Anxiety  Depression  COVID-19  Relevant Medical History: L bimalleolar fracture, B plantar fasciitis (R more affected than L), significant R lateral ankle sprain, 1999 laminectomy resulting in numbness top of R foot                                         Precautions/ Contra-indications:           Latex allergy:  NO  Pacemaker:    NO  Contraindications for Manipulation: None  Date of Surgery: n/a - procedure performed 6/10/24  Other:    Red Flags:  None    Suicide Screening:   The patient did not verbalize a primary behavioral concern, suicidal ideation, suicidal intent, or demonstrate suicidal behaviors.    Preferred Language for Healthcare:   [x] English       [] other:    SUBJECTIVE EXAMINATION     Patient stated complaint: Pt reports it feels like her heel is popping out a little. Feels pressure is pushing/lifting inner part of heel. She has enough room in toe box.        Test

## 2024-10-25 DIAGNOSIS — R19.7 DIARRHEA, UNSPECIFIED TYPE: Primary | ICD-10-CM

## 2024-10-28 ENCOUNTER — HOSPITAL ENCOUNTER (OUTPATIENT)
Dept: PHYSICAL THERAPY | Age: 54
Setting detail: THERAPIES SERIES
Discharge: HOME | End: 2024-10-28
Payer: COMMERCIAL

## 2024-10-28 PROCEDURE — 97530 THERAPEUTIC ACTIVITIES: CPT

## 2024-10-28 PROCEDURE — 97140 MANUAL THERAPY 1/> REGIONS: CPT

## 2024-10-28 PROCEDURE — 97110 THERAPEUTIC EXERCISES: CPT

## 2024-10-28 NOTE — PLAN OF CARE
[facility] - Outpatient Rehabilitation and Therapy [address], [place] [address] office: [phone] fax: [phone]    Physical Therapy Re-Certification Plan of Care    Dear Les Hughes MD  ,    We had the pleasure of treating the following patient for physical therapy services at [facility]. A summary of our findings can be found in the updated assessment below.  This includes our plan of care.  If you have any questions or concerns regarding these findings, please do not hesitate to contact me at the office phone number checked above.  Thank you for the referral.     Physician Signature:________________________________Date:__________________  By signing above (or electronic signature), therapist's plan is approved by physician      Functional Outcome: DEMARCO Porter 1970 continues to present with functional deficits in ROM  limiting ability with heavy home activity and recreational activities  .  During therapy this date, patient required manual interventions for modulating pain. Patient will continue to benefit from ongoing evaluation and advanced clinical decision from a Physical Therapist to improve pain control, muscle strength, and neuromuscular control to safely return to PLOF without symptoms or restrictions.    Overall Response to Treatment:  Patient has made slow progress since beginning therapy and symptoms have been highly variable on any given day / tend to be very irritable. She is very compliant with her HEP and her appointments and is presenting today with very low level symptoms. Patient will be performing her HEP over next two weeks and following up in 2-3 weeks to assess symptoms for possible d/c.    Total Visits: 20     Recommendation:    [x] Schedule 1 follow up visit in 2-3 weeks   [] Hold PT, pending MD visit   [] Discharge to HEP. Follow up with PT or MD PRN.        Physical Therapy: TREATMENT/PROGRESS NOTE

## 2024-11-07 ENCOUNTER — APPOINTMENT (OUTPATIENT)
Dept: PHYSICAL THERAPY | Age: 54
End: 2024-11-07
Payer: COMMERCIAL

## 2024-11-12 ENCOUNTER — HOSPITAL ENCOUNTER (OUTPATIENT)
Age: 54
Discharge: HOME | End: 2024-11-12
Payer: COMMERCIAL

## 2024-11-12 LAB — C DIFF TOX A+B STL QL IA: NORMAL

## 2024-11-12 PROCEDURE — 83993 ASSAY FOR CALPROTECTIN FECAL: CPT

## 2024-11-12 PROCEDURE — 87324 CLOSTRIDIUM AG IA: CPT

## 2024-11-12 PROCEDURE — 87449 NOS EACH ORGANISM AG IA: CPT

## 2024-11-13 LAB — CALPROTECTIN STL-MCNT: 7 UG/G

## 2024-11-19 ENCOUNTER — APPOINTMENT (OUTPATIENT)
Dept: PHYSICAL THERAPY | Age: 54
End: 2024-11-19
Payer: COMMERCIAL

## 2024-11-26 ENCOUNTER — HOSPITAL ENCOUNTER (OUTPATIENT)
Dept: PHYSICAL THERAPY | Age: 54
Setting detail: THERAPIES SERIES
Discharge: HOME | End: 2024-11-26
Payer: COMMERCIAL

## 2024-11-26 PROCEDURE — 97530 THERAPEUTIC ACTIVITIES: CPT

## 2024-11-26 NOTE — DISCHARGE SUMMARY
[facility] - Outpatient Rehabilitation and Therapy [address], [place] [address] office: [phone] fax: [phone]     Physical Therapy Discharge Summary    Dear Les Hughes MD   ,    We had the pleasure of treating the following patient for physical therapy services at [facility] Physical Therapy.  A summary of our findings can be found in the discharge summary below.  If you have any questions or concerns regarding these findings, please do not hesitate to contact me at the office phone number checked above.  Thank you for the referral.         Total Visits: 21     Recommendation:    [] Continue PT x / wk for  weeks.   [] Hold PT, pending MD visit   [x] Discharge to HEP. Follow up with PT or MD PRN.     Reason for Discharge:   recommend patient follow up with MD. Fernanda did demonstrate an improvement in objective measures, but continues to struggle with symptom management which is overall limiting her performance of ADLs and her QOL. Recommended she continue with her current HEP in the meantime and follow up if needed.             Physical Therapy: TREATMENT/PROGRESS NOTE   Patient: Fernanda Porter (54 y.o. female)   Examination Date: 2024   :  1970 MRN: [id number]   Visit #: 21   Insurance Allowable Auth Needed   60 []Yes    [x]No    Insurance: Payor: UMR / Plan: UMR / Product Type: *No Product type* /   Insurance ID: [id number] - (Commercial)  Secondary Insurance (if applicable):    Treatment Diagnosis:     ICD-10-CM    1. Achilles tendinitis of right lower extremity  M76.61          Medical Diagnosis:  Achilles tendinosis of right ankle [M67.873]   Referring Physician: Les Hughes MD  PCP: Roberto Madera MD     Plan of care signed (Y/N):     Date of Patient follow up with Physician:      Plan of Care Report: YES   POC update due: (10 visits /OR AUTH LIMITS, whichever is less)  10/29/2024                                             Medical

## 2025-02-17 ENCOUNTER — HOSPITAL ENCOUNTER (OUTPATIENT)
Dept: PHYSICAL THERAPY | Age: 55
Setting detail: THERAPIES SERIES
Discharge: HOME | End: 2025-02-17
Payer: COMMERCIAL

## 2025-02-17 DIAGNOSIS — R29.898 LEFT ARM WEAKNESS: Primary | ICD-10-CM

## 2025-02-17 PROCEDURE — 97530 THERAPEUTIC ACTIVITIES: CPT

## 2025-02-17 PROCEDURE — 97110 THERAPEUTIC EXERCISES: CPT

## 2025-02-17 PROCEDURE — 97161 PT EVAL LOW COMPLEX 20 MIN: CPT

## 2025-02-17 NOTE — PLAN OF CARE
[facility] - Outpatient Rehabilitation and Therapy: [address], [place] [address] office: [phone] fax: [phone]     Physical Therapy Initial Evaluation Certification      Dear Tiffany Kaur MD ,    We had the pleasure of evaluating the following patient for physical therapy services at [facility].  A summary of our findings can be found in the initial assessment below.  This includes our plan of care.  If you have any questions or concerns regarding these findings, please do not hesitate to contact me at the office phone number listed above.  Thank you for the referral.     Physician Signature:_______________________________Date:__________________  By signing above (or electronic signature), therapist’s plan is approved by physician       Physical Therapy: TREATMENT/PROGRESS NOTE   Patient: Fernanda Porter (54 y.o. female)   Examination Date: 2025   :  1970 MRN: [id number]   Visit #: 1   Insurance Allowable Auth Needed   60 []Yes    [x]No    Insurance: Payor: UMR / Plan: UMR / Product Type: *No Product type* /   Insurance ID: [id number] - (Commercial)  Secondary Insurance (if applicable):    Treatment Diagnosis: -    ICD-10-CM    1. Left arm weakness  R29.898          Medical Diagnosis:  Other specified disorders of tendon, left elbow [M67.824]   Referring Physician: Tiffany Kaur MD  PCP: Roberto Madera MD     Plan of care signed (Y/N):     Date of Patient follow up with Physician:      Plan of Care Report: EVAL today  POC update due: (10 visits /OR AUTH LIMITS, whichever is less) - 3/19/2025                                             Medical History:  Comorbidities:  None  Relevant Medical History: -                                         Precautions/ Contra-indications:           Latex allergy:  NO  Pacemaker:    NO  Contraindications for Manipulation: None  Date of Surgery: n/a  Other:    Red Flags:  None    Suicide Screening:

## 2025-02-21 ENCOUNTER — HOSPITAL ENCOUNTER (OUTPATIENT)
Dept: PHYSICAL THERAPY | Age: 55
Setting detail: THERAPIES SERIES
Discharge: HOME | End: 2025-02-21
Payer: COMMERCIAL

## 2025-02-21 PROCEDURE — 20561 NDL INSJ W/O NJX 3+ MUSC: CPT

## 2025-02-21 PROCEDURE — 97110 THERAPEUTIC EXERCISES: CPT

## 2025-02-21 NOTE — FLOWSHEET NOTE
[facility] - Outpatient Rehabilitation and Therapy: [address], [place] [address] office: [phone] fax: [phone]         Physical Therapy: TREATMENT/PROGRESS NOTE   Patient: Fernanda Porter (54 y.o. female)   Examination Date: 2025   :  1970 MRN: [id number]   Visit #: 2   Insurance Allowable Auth Needed   60 []Yes    [x]No    Insurance: Payor: UMR / Plan: UMR / Product Type: *No Product type* /   Insurance ID: [id number] - (Commercial)  Secondary Insurance (if applicable):    Treatment Diagnosis: -    ICD-10-CM    1. Left arm weakness  R29.898          Medical Diagnosis:  Other specified disorders of tendon, left elbow [M67.824]   Referring Physician: Tiffany Kaur MD  PCP: Roberto Madera MD     Plan of care signed (Y/N): N    Date of Patient follow up with Physician:      Plan of Care Report: NO  POC update due: (10 visits /OR AUTH LIMITS, whichever is less) - 3/19/2025                                             Medical History:  Comorbidities:  None  Relevant Medical History: -                                         Precautions/ Contra-indications:           Latex allergy:  NO  Pacemaker:    NO  Contraindications for Manipulation: None  Date of Surgery: n/a  Other:    Red Flags:  None    Suicide Screening:   The patient did not verbalize a primary behavioral concern, suicidal ideation, suicidal intent, or demonstrate suicidal behaviors.    Preferred Language for Healthcare:   [x] English       [] other:    SUBJECTIVE EXAMINATION     Patient stated complaint: Pt reports HEP compliance. Still wearing brace.    Hx: Pt reports she had to give blood for a lab draw a few months ago and that hurt her arm for a couple days but then coincidentally the pain persisted but changed and has been there since. Pt reports she was told she had golfer's elbow and has been wearing a brace for a few weeks which she is not sure how much it helps. Her pain is mostly with moving her arm

## 2025-02-25 ENCOUNTER — HOSPITAL ENCOUNTER (OUTPATIENT)
Dept: PHYSICAL THERAPY | Age: 55
Setting detail: THERAPIES SERIES
Discharge: HOME | End: 2025-02-25
Payer: COMMERCIAL

## 2025-02-25 PROCEDURE — 97140 MANUAL THERAPY 1/> REGIONS: CPT

## 2025-02-25 PROCEDURE — 97110 THERAPEUTIC EXERCISES: CPT

## 2025-02-25 NOTE — FLOWSHEET NOTE
[facility] - Outpatient Rehabilitation and Therapy: [address], [place] [address] office: [phone] fax: [phone]         Physical Therapy: TREATMENT/PROGRESS NOTE   Patient: Fernanda Porter (54 y.o. female)   Examination Date: 2025   :  1970 MRN: [id number]   Visit #: 3   Insurance Allowable Auth Needed   60 []Yes    [x]No    Insurance: Payor: UMR / Plan: UMR / Product Type: *No Product type* /   Insurance ID: [id number] - (Commercial)  Secondary Insurance (if applicable):    Treatment Diagnosis: -    ICD-10-CM    1. Left arm weakness  R29.898          Medical Diagnosis:  Other specified disorders of tendon, left elbow [M67.824]   Referring Physician: Tiffany Kaur MD  PCP: Roberto Madera MD     Plan of care signed (Y/N): N    Date of Patient follow up with Physician:      Plan of Care Report: NO  POC update due: (10 visits /OR AUTH LIMITS, whichever is less) - 3/19/2025                                             Medical History:  Comorbidities:  None  Relevant Medical History: -                                         Precautions/ Contra-indications:           Latex allergy:  NO  Pacemaker:    NO  Contraindications for Manipulation: None  Date of Surgery: n/a  Other:    Red Flags:  None    Suicide Screening:   The patient did not verbalize a primary behavioral concern, suicidal ideation, suicidal intent, or demonstrate suicidal behaviors.    Preferred Language for Healthcare:   [x] English       [] other:    SUBJECTIVE EXAMINATION     Patient stated complaint: Pt reports she was sore the day of and the next 2 after the needling. Today she still notes times of pain especially bending her elbow fully and after a long period holding a book while reading straightening her arm. The pain is much less than it was but still there.        Test used Initial score  2025   Pain Summary VAS 0-8/10 lateral elbow 0/10 at rest  5/10 with elbow flexion   Functional

## 2025-02-28 ENCOUNTER — APPOINTMENT (OUTPATIENT)
Dept: PHYSICAL THERAPY | Age: 55
End: 2025-02-28
Payer: COMMERCIAL

## 2025-03-11 ENCOUNTER — HOSPITAL ENCOUNTER (OUTPATIENT)
Dept: PHYSICAL THERAPY | Age: 55
Setting detail: THERAPIES SERIES
Discharge: HOME | End: 2025-03-11
Payer: COMMERCIAL

## 2025-03-11 PROCEDURE — 97140 MANUAL THERAPY 1/> REGIONS: CPT

## 2025-03-11 PROCEDURE — 97110 THERAPEUTIC EXERCISES: CPT

## 2025-03-11 NOTE — FLOWSHEET NOTE
[facility] - Outpatient Rehabilitation and Therapy: [address], [place] [address] office: [phone] fax: [phone]         Physical Therapy: TREATMENT/PROGRESS NOTE   Patient: Fernanda Porter (54 y.o. female)   Examination Date: 2025   :  1970 MRN: [id number]   Visit #: 4   Insurance Allowable Auth Needed   60 []Yes    [x]No    Insurance: Payor: UMR / Plan: UMR / Product Type: *No Product type* /   Insurance ID: [id number] - (Commercial)  Secondary Insurance (if applicable):    Treatment Diagnosis: -    ICD-10-CM    1. Left arm weakness  R29.898          Medical Diagnosis:  Other specified disorders of tendon, left elbow [M67.824]   Referring Physician: Tiffany Kaur MD  PCP: Roberto Madera MD     Plan of care signed (Y/N): N    Date of Patient follow up with Physician:      Plan of Care Report: NO  POC update due: (10 visits /OR AUTH LIMITS, whichever is less) - 3/19/2025                                             Medical History:  Comorbidities:  None  Relevant Medical History: -                                         Precautions/ Contra-indications:           Latex allergy:  NO  Pacemaker:    NO  Contraindications for Manipulation: None  Date of Surgery: n/a  Other:    Red Flags:  None    Suicide Screening:   The patient did not verbalize a primary behavioral concern, suicidal ideation, suicidal intent, or demonstrate suicidal behaviors.    Preferred Language for Healthcare:   [x] English       [] other:    SUBJECTIVE EXAMINATION     Patient stated complaint: Pt reports she has been doing ok, progressively better but still sore. After lat session she did go on a bike ride and was for about 8 miles which she has minimal issues during but sore after for a day or so. Just got back from an out of town trip, handling        Test used Initial score  2025   Pain Summary VAS 0-8/10 lateral elbow 0/10 at rest  5/10 with elbow flexion   Functional

## 2025-03-14 ENCOUNTER — HOSPITAL ENCOUNTER (OUTPATIENT)
Dept: PHYSICAL THERAPY | Age: 55
Setting detail: THERAPIES SERIES
Discharge: HOME | End: 2025-03-14
Payer: COMMERCIAL

## 2025-03-14 PROCEDURE — 20561 NDL INSJ W/O NJX 3+ MUSC: CPT

## 2025-03-14 PROCEDURE — 97110 THERAPEUTIC EXERCISES: CPT

## 2025-03-14 NOTE — FLOWSHEET NOTE
[facility] - Outpatient Rehabilitation and Therapy: [address], [place] [address] office: [phone] fax: [phone]         Physical Therapy: TREATMENT/PROGRESS NOTE   Patient: Fernanda Porter (54 y.o. female)   Examination Date: 2025   :  1970 MRN: [id number]   Visit #: 5   Insurance Allowable Auth Needed   60 []Yes    [x]No    Insurance: Payor: UMR / Plan: UMR / Product Type: *No Product type* /   Insurance ID: [id number] - (Commercial)  Secondary Insurance (if applicable):    Treatment Diagnosis: -    ICD-10-CM    1. Left arm weakness  R29.898          Medical Diagnosis:  Other specified disorders of tendon, left elbow [M67.824]   Referring Physician: Tiffany Kaur MD  PCP: Roberto Madera MD     Plan of care signed (Y/N): N    Date of Patient follow up with Physician:      Plan of Care Report: NO  POC update due: (10 visits /OR AUTH LIMITS, whichever is less) - 3/19/2025                                             Medical History:  Comorbidities:  None  Relevant Medical History: -                                         Precautions/ Contra-indications:           Latex allergy:  NO  Pacemaker:    NO  Contraindications for Manipulation: None  Date of Surgery: n/a  Other:    Red Flags:  None    Suicide Screening:   The patient did not verbalize a primary behavioral concern, suicidal ideation, suicidal intent, or demonstrate suicidal behaviors.    Preferred Language for Healthcare:   [x] English       [] other:    SUBJECTIVE EXAMINATION     Patient stated complaint: Pt reports no issues after biking and strenuous exercises in last PT session.       Test used Initial score  2025   Pain Summary VAS 0-8/10 lateral elbow 0/10 at rest  5/10 with elbow flexion   Functional questionnaire Quick DASH 40 / 66%    Other:              Pain:  Pain location: L elbow, laterally and bicep tendon  Patient describes pain to be intermittent, Sharp, and burning  Pain decreases

## 2025-03-18 ENCOUNTER — APPOINTMENT (OUTPATIENT)
Dept: PHYSICAL THERAPY | Age: 55
End: 2025-03-18
Payer: COMMERCIAL

## 2025-03-22 ENCOUNTER — APPOINTMENT (OUTPATIENT)
Dept: PHYSICAL THERAPY | Age: 55
End: 2025-03-22
Payer: COMMERCIAL

## 2025-03-25 ENCOUNTER — HOSPITAL ENCOUNTER (OUTPATIENT)
Dept: PHYSICAL THERAPY | Age: 55
Setting detail: THERAPIES SERIES
Discharge: HOME | End: 2025-03-25
Payer: COMMERCIAL

## 2025-03-25 PROCEDURE — 97110 THERAPEUTIC EXERCISES: CPT

## 2025-03-25 PROCEDURE — 97140 MANUAL THERAPY 1/> REGIONS: CPT

## 2025-03-25 NOTE — FLOWSHEET NOTE
[facility] - Outpatient Rehabilitation and Therapy: [address], [place] [address] office: [phone] fax: [phone]         Physical Therapy: TREATMENT/PROGRESS NOTE   Patient: Fernanda Porter (54 y.o. female)   Examination Date: 2025   :  1970 MRN: [id number]   Visit #: 6   Insurance Allowable Auth Needed   60 []Yes    [x]No    Insurance: Payor: UMR / Plan: UMR / Product Type: *No Product type* /   Insurance ID: [id number] - (Commercial)  Secondary Insurance (if applicable):    Treatment Diagnosis: -    ICD-10-CM    1. Left arm weakness  R29.898          Medical Diagnosis:  Other specified disorders of tendon, left elbow [M67.824]   Referring Physician: Tiffany Kaur MD  PCP: Roberto Madera MD     Plan of care signed (Y/N): N    Date of Patient follow up with Physician:      Plan of Care Report: NO  POC update due: (10 visits /OR AUTH LIMITS, whichever is less) - 3/19/2025                                             Medical History:  Comorbidities:  None  Relevant Medical History: -                                         Precautions/ Contra-indications:           Latex allergy:  NO  Pacemaker:    NO  Contraindications for Manipulation: None  Date of Surgery: n/a  Other:    Red Flags:  None    Suicide Screening:   The patient did not verbalize a primary behavioral concern, suicidal ideation, suicidal intent, or demonstrate suicidal behaviors.    Preferred Language for Healthcare:   [x] English       [] other:    SUBJECTIVE EXAMINATION     Patient stated complaint: Pt reports she was doing really well with minimal pain if any. Yesterday she did start to do more full body exercises which today she has a lot of upper body soreness. More specific soreness around her L elbow but not like the initial pain. Still has some pain with full elbow flexion around lateral elbow.        Test used Initial score  2025   Pain Summary VAS 0-8/10 lateral elbow 0/10 at

## 2025-03-31 ENCOUNTER — HOSPITAL ENCOUNTER (OUTPATIENT)
Dept: PHYSICAL THERAPY | Age: 55
Setting detail: THERAPIES SERIES
Discharge: HOME | End: 2025-03-31
Payer: COMMERCIAL

## 2025-03-31 PROCEDURE — 97110 THERAPEUTIC EXERCISES: CPT

## 2025-03-31 PROCEDURE — 20561 NDL INSJ W/O NJX 3+ MUSC: CPT

## 2025-03-31 NOTE — PLAN OF CARE
EXERCISE - Provided verbal/tactile cueing for HEP and/or activities related to strengthening, flexibility, endurance, ROM performed to prevent loss of range of motion, maintain or improve muscular strength or increase flexibility, following either an injury or surgery.     GOALS     Patient stated goal: eliminate pain and get back to her hobbies, blow drying her hair and bike riding without pain  [] Progressing: [] Met: [] Not Met: [] Adjusted    Therapist goals for Patient:   Short Term Goals: To be achieved in: 2 weeks  1Independent in HEP and progression per patient tolerance, in order to prevent re-injury.   [] Progressing: [x] Met: [] Not Met: [] Adjusted  Patient will have a decrease in pain to <3/10 to facilitate improvement in movement, function, and ADLs as indicated by Functional Deficits.  [] Progressing: [x] Met: [] Not Met: [] Adjusted    IF APPLICABLE:  [] Patient to demonstrate independence in wear and care for custom orthotic device. (Only if applicable for orthotic eval)     Long Term Goals: To be achieved in: 8 weeks  Disability index score of 10% or less for the Quick DASH to assist with reaching prior level of function with activities such as carrying shopping bag weighing greater than or equal to 2/10 at worst.  [x] Progressing: [] Met: [] Not Met: [] Adjusted  Patient will demonstrate increased AROM of L elbow to WNL in all planes without pain to allow for proper joint functioning to enable patient to reach into cabinets at home without difficulty.   [] Progressing: [x] Met: [] Not Met: [] Adjusted  Patient will demonstrate increased Strength of L  strength to within 5lb with HHD of contralateral limb to allow for proper functional mobility to enable patient to return to opening new jars and containers without pain.   [] Progressing: [x] Met: [] Not Met: [] Adjusted  Patient will return to sleeping at night without increased symptoms or restriction.   [x] Progressing: [] Met: [] Not Met: []

## 2025-04-03 ENCOUNTER — HOSPITAL ENCOUNTER (OUTPATIENT)
Dept: PHYSICAL THERAPY | Age: 55
Setting detail: THERAPIES SERIES
Discharge: HOME | End: 2025-04-03
Payer: COMMERCIAL

## 2025-04-03 PROCEDURE — 97110 THERAPEUTIC EXERCISES: CPT

## 2025-04-03 PROCEDURE — 97140 MANUAL THERAPY 1/> REGIONS: CPT

## 2025-04-03 NOTE — FLOWSHEET NOTE
[facility] - Outpatient Rehabilitation and Therapy: [address], [place] [address] office: [phone] fax: [phone]         Physical Therapy: TREATMENT/PROGRESS NOTE   Patient: Fernanda Porter (54 y.o. female)   Examination Date: 2025   :  1970 MRN: [id number]   Visit #: 8   Insurance Allowable Auth Needed   60 []Yes    [x]No    Insurance: Payor: UMR / Plan: UMR / Product Type: *No Product type* /   Insurance ID: [id number] - (Commercial)  Secondary Insurance (if applicable):    Treatment Diagnosis: -    ICD-10-CM    1. Left arm weakness  R29.898          Medical Diagnosis:  Other specified disorders of tendon, left elbow [M67.824]   Referring Physician: Tiffany Kaur MD  PCP: Roberto Madera MD     Plan of care signed (Y/N): N    Date of Patient follow up with Physician:      Plan of Care Report: NO  POC update due: (10 visits /OR AUTH LIMITS, whichever is less) - 3/19/2025                                             Medical History:  Comorbidities:  None  Relevant Medical History: -                                         Precautions/ Contra-indications:           Latex allergy:  NO  Pacemaker:    NO  Contraindications for Manipulation: None  Date of Surgery: n/a  Other:    Red Flags:  None    Suicide Screening:   The patient did not verbalize a primary behavioral concern, suicidal ideation, suicidal intent, or demonstrate suicidal behaviors.    Preferred Language for Healthcare:   [x] English       [] other:    SUBJECTIVE EXAMINATION     Patient stated complaint: Pt states that her elbow is doing well with no pain at rest and minimal pain with activity at home. She says that she was raking leaves over the weekend and she was able to \"listen\" to her elbow and stopped when it started to feel painful. She says that her elbow still feels sore while reaching weight over head, but she does feel stronger compared to when she started therapy. She has been doing her HEP but

## 2025-04-07 ENCOUNTER — HOSPITAL ENCOUNTER (OUTPATIENT)
Dept: PHYSICAL THERAPY | Age: 55
Setting detail: THERAPIES SERIES
Discharge: HOME | End: 2025-04-07
Payer: COMMERCIAL

## 2025-04-07 PROCEDURE — 97110 THERAPEUTIC EXERCISES: CPT

## 2025-04-07 PROCEDURE — 20560 NDL INSJ W/O NJX 1 OR 2 MUSC: CPT

## 2025-04-07 NOTE — FLOWSHEET NOTE
that may include: Cryotherapy, Electrical Stimulation, and Thermal Agents  Patient education on joint protection, postural re-education, activity modification, and progression of HEP    Plan: Continue to work on lateral elbow soft tissue mobility, DN prn, progressive loading as tolerated; progress to full AROM wrist/forearm strength    Electronically Signed by Tiffanie Henriquez, PT, DPT, ATC  Date: 04/07/2025     Note: Portions of this note have been templated and/or copied from initial evaluation, reassessments and prior notes for documentation efficiency.    Note: If patient does not return for scheduled/recommended follow up visits, this note will serve as a discharge from care along with the most recent update on progress.

## 2025-04-10 ENCOUNTER — APPOINTMENT (OUTPATIENT)
Dept: PHYSICAL THERAPY | Age: 55
End: 2025-04-10
Payer: COMMERCIAL

## 2025-04-14 ENCOUNTER — APPOINTMENT (OUTPATIENT)
Dept: PHYSICAL THERAPY | Age: 55
End: 2025-04-14
Payer: COMMERCIAL

## 2025-04-28 ENCOUNTER — HOSPITAL ENCOUNTER (OUTPATIENT)
Dept: PHYSICAL THERAPY | Age: 55
Setting detail: THERAPIES SERIES
Discharge: HOME | End: 2025-04-28
Payer: COMMERCIAL

## 2025-04-28 PROCEDURE — 97140 MANUAL THERAPY 1/> REGIONS: CPT

## 2025-04-28 PROCEDURE — 97110 THERAPEUTIC EXERCISES: CPT

## 2025-04-28 PROCEDURE — 97112 NEUROMUSCULAR REEDUCATION: CPT

## 2025-04-28 NOTE — FLOWSHEET NOTE
[facility] - Outpatient Rehabilitation and Therapy: [address], [place] [address] office: [phone] fax: [phone]  Physical Therapy Re-Certification Plan of Care    Dear Tiffany Kaur MD  ,    We had the pleasure of treating the following patient for physical therapy services at [facility]. A summary of our findings can be found in the updated assessment below.  This includes our plan of care.  If you have any questions or concerns regarding these findings, please do not hesitate to contact me at the office phone number checked above.  Thank you for the referral.     Physician Signature:________________________________Date:__________________  By signing above (or electronic signature), therapist's plan is approved by physician      Total Visits: 10     Overall Response to Treatment:  Pt has met all PT goals and is appropriate to dc to indep HEP. Pt to return PRN.    Recommendation:    [] Continue PT x / wk for  weeks.   [] Hold PT, pending MD visit   [x] Discharge to HEP. Follow up with PT or MD PRN.          Physical Therapy: TREATMENT/PROGRESS NOTE   Patient: Fernanda Porter (54 y.o. female)   Examination Date: 2025   :  1970 MRN: [id number]   Visit #: 10   Insurance Allowable Auth Needed   60 []Yes    [x]No    Insurance: Payor: UMR / Plan: UMR / Product Type: *No Product type* /   Insurance ID: [id number] - (Commercial)  Secondary Insurance (if applicable):    Treatment Diagnosis: -    ICD-10-CM    1. Left arm weakness  R29.898          Medical Diagnosis:  Other specified disorders of tendon, left elbow [M67.824]   Referring Physician: Tiffany Kaur MD  PCP: Roberto Madera MD     Plan of care signed (Y/N): N    Date of Patient follow up with Physician:      Plan of Care Report: NO  POC update due: (10 visits /OR AUTH LIMITS, whichever is less) -                                              Medical